# Patient Record
Sex: FEMALE | Race: WHITE | NOT HISPANIC OR LATINO | Employment: FULL TIME | URBAN - METROPOLITAN AREA
[De-identification: names, ages, dates, MRNs, and addresses within clinical notes are randomized per-mention and may not be internally consistent; named-entity substitution may affect disease eponyms.]

---

## 2017-02-13 ENCOUNTER — TRANSCRIBE ORDERS (OUTPATIENT)
Dept: URGENT CARE | Facility: CLINIC | Age: 57
End: 2017-02-13

## 2017-02-13 DIAGNOSIS — S46.819A TRAPEZIUS STRAIN, UNSPECIFIED LATERALITY, INITIAL ENCOUNTER: Primary | ICD-10-CM

## 2017-02-16 ENCOUNTER — TREATMENT (OUTPATIENT)
Dept: PHYSICAL THERAPY | Facility: CLINIC | Age: 57
End: 2017-02-16

## 2017-02-16 DIAGNOSIS — S46.911D SHOULDER STRAIN, RIGHT, SUBSEQUENT ENCOUNTER: ICD-10-CM

## 2017-02-16 DIAGNOSIS — S16.1XXD CERVICAL STRAIN, SUBSEQUENT ENCOUNTER: Primary | ICD-10-CM

## 2017-02-16 PROCEDURE — 97140 MANUAL THERAPY 1/> REGIONS: CPT | Performed by: PHYSICAL THERAPIST

## 2017-02-16 PROCEDURE — 97001 PR PHYS THERAPY EVALUATION: CPT | Performed by: PHYSICAL THERAPIST

## 2017-02-16 PROCEDURE — 97014 ELECTRIC STIMULATION THERAPY: CPT | Performed by: PHYSICAL THERAPIST

## 2017-02-16 PROCEDURE — 97110 THERAPEUTIC EXERCISES: CPT | Performed by: PHYSICAL THERAPIST

## 2017-02-16 NOTE — PROGRESS NOTES
Physical Therapy Initial Evaluation and Plan of Care    TIME IN 11:06 TIME OUT 12:21    Subjective Evaluation    History of Present Illness  Date of onset: 2017  Mechanism of injury: Catching a patient from falling while transferring her    Pain  Current pain ratin  At best pain ratin  At worst pain ratin  Location: R>>L upper trap region; also N/T into RSF  Quality: pressure and cramping  Relieving factors: heat and change in position  Aggravating factors: lifting and overhead activity  Progression: no change    Hand dominance: right    Diagnostic Tests  X-ray: abnormal (arthritis)    Treatments  Current treatment: medication  Patient Goals  Patient goals for therapy: decreased pain and return to work  Patient goal: STGs x 1 wk  1. Reports dec pain with use of RUE for ADLs to < 4/10  2. Reports dec N/T RUE by > 50%  3. Improved posturing to allow improved ROM  4. Decreased sleep disturbance    LTGs x 4 wks  1. ROM and strength WFL with min to no pain  2. No parasthesias RUE  3. Negative impingement testing  4. Demos dennis for patient transfer simulation to allow RTW w/o restriction             Objective     Postural Observations  Seated posture: fair        Palpation   Left   Hypertonic in the levator scapulae and upper trapezius.   Tenderness of the levator scapulae and upper trapezius.     Right   Hypertonic in the levator scapulae and upper trapezius. Tenderness of the cervical interspinals, levator scapulae and upper trapezius.     Tenderness     Right Shoulder  Tenderness in the AC joint, acromion and biceps tendon (proximal).     Active Range of Motion   Cervical/Thoracic Spine   Cervical    Flexion: 43 degrees   Extension: 32 degrees with pain  Left lateral flexion: 25 degrees   Right lateral flexion: 20 degrees   Left rotation: 41 degrees   Right rotation: 48 degrees   Left Shoulder   Flexion: WFL  Abduction: WFL  External rotation BTH: WFL  Internal rotation BTB: WFL    Right Shoulder    Flexion: 128 degrees   Abduction: 121 degrees   External rotation BTH: T2   Internal rotation BTB: T10     Strength/Myotome Testing   Cervical Spine   Neck extension: 4  Neck flexion: 4    Left   Neck lateral flexion (C3): 4+    Right   Neck lateral flexion (C3): 4    Left Shoulder   Normal muscle strength    Right Shoulder     Planes of Motion   Flexion: 4-   Adduction: 4+   External rotation at 0°: 5   Internal rotation at 0°: 5     Tests   Cervical     Right   Negative cervical distraction and Spurling's sign.     Right Shoulder   Positive active compression ([place]), Hawkin's, Speed's, ULTT1 and ULTT3.   Negative sulcus sign.          Assessment & Plan     Assessment  Assessment details: 57 yo F with acute onset pain and RUE parasthesias from transferring a patient presents with signs/sxs shdr impingement with biceps and labral involvement and ESTEFANIA.  Prognosis: good    Goals  STGs x 1 wk  1. Reports dec pain with use of RUE for ADLs to < 4/10  2. Reports dec N/T RUE by > 50%  3. Improved posturing to allow improved ROM  4. Decreased sleep disturbance    LTGs x 4 wks  1. ROM and strength WFL with min to no pain  2. No parasthesias RUE  3. Negative impingement testing  4. Demos dennis for patient transfer simulation to allow RTW w/o restriction      Plan  Therapy options: will be seen for skilled physical therapy services  Planned modality interventions: cryotherapy, thermotherapy (hydrocollator packs), electrical stimulation/[place] stimulation and ultrasound  Planned therapy interventions: manual therapy, soft tissue mobilization, spinal/joint mobilization, strengthening, stretching, home exercise program and therapeutic activities  Frequency: 3x week  Duration in weeks: 4  Treatment plan discussed with: patient        Manual Therapy:    10     mins  20319;  Therapeutic Exercise:    13     mins  80498;     Neuromuscular Donna:    0    mins  15936;    Therapeutic Activity:     3    mins  95150;     Gait Training:       0     mins  32944;     Ultrasound:     0     mins  28146;    Work Hardening           0      mins 69027  Iontophoresis               0   mins 56823    Timed Treatment:   26   mins   Total Treatment:     65   mins    PT SIGNATURE: Elsy Kolb, YULIA   DATE TREATMENT INITIATED: 2/16/2017    Initial Certification  Certification Period: 5/17/2017  I certify that the therapy services are furnished while this patient is under my care.  The services outlined above are required by this patient, and will be reviewed every 90 days.     PHYSICIAN:       DATE:     Please sign and return via fax to [phone].. Thank you, [facility].

## 2017-02-16 NOTE — PATIENT INSTRUCTIONS
Educated about Dx and exam findings, rationale and POC. Gave handout on HEP with instructions.  Posture

## 2017-02-20 ENCOUNTER — TREATMENT (OUTPATIENT)
Dept: PHYSICAL THERAPY | Facility: CLINIC | Age: 57
End: 2017-02-20

## 2017-02-20 DIAGNOSIS — S16.1XXD CERVICAL STRAIN, SUBSEQUENT ENCOUNTER: Primary | ICD-10-CM

## 2017-02-20 DIAGNOSIS — S46.911D SHOULDER STRAIN, RIGHT, SUBSEQUENT ENCOUNTER: ICD-10-CM

## 2017-02-20 PROCEDURE — 97110 THERAPEUTIC EXERCISES: CPT | Performed by: PHYSICAL THERAPIST

## 2017-02-20 PROCEDURE — 97014 ELECTRIC STIMULATION THERAPY: CPT | Performed by: PHYSICAL THERAPIST

## 2017-02-20 PROCEDURE — 97140 MANUAL THERAPY 1/> REGIONS: CPT | Performed by: PHYSICAL THERAPIST

## 2017-02-20 NOTE — PROGRESS NOTES
Physical Therapy Daily Progress Note    Time In 9:19  Time Out 10;12    Yelena Flower reports: still hurts and tight and difficulty sleeping but feels better; not as N/T    Subjective Evaluation    Pain  Current pain ratin           Objective   See Exercise, Manual, and Modality Logs for complete treatment.       Assessment & Plan     Assessment  Assessment details: Responding favorably to Rx with dec c/o pain and parasthesias but still with mod sxs and notable tightness cerv-thor mm    Plan  Plan details: Progress postural ex as dennis                     Manual Therapy:    10     mins  61333;  Therapeutic Exercise:    24     mins  43102;     Neuromuscular Donna:    0    mins  60644;    Therapeutic Activity:     0     mins  57865;     Gait Trainin     mins  56802;     Ultrasound:     0     mins  38826;    Work Hardening           0      mins 76789  Iontophoresis               0   mins 50042    Timed Treatment:   34   mins   Total Treatment:     53   mins    Elsy Kolb PT  Physical Therapist

## 2017-02-22 ENCOUNTER — TREATMENT (OUTPATIENT)
Dept: PHYSICAL THERAPY | Facility: CLINIC | Age: 57
End: 2017-02-22

## 2017-02-22 DIAGNOSIS — S16.1XXD CERVICAL STRAIN, SUBSEQUENT ENCOUNTER: Primary | ICD-10-CM

## 2017-02-22 DIAGNOSIS — S46.911D SHOULDER STRAIN, RIGHT, SUBSEQUENT ENCOUNTER: ICD-10-CM

## 2017-02-22 PROCEDURE — 97014 ELECTRIC STIMULATION THERAPY: CPT | Performed by: PHYSICAL THERAPIST

## 2017-02-22 PROCEDURE — 97140 MANUAL THERAPY 1/> REGIONS: CPT | Performed by: PHYSICAL THERAPIST

## 2017-02-22 PROCEDURE — 97110 THERAPEUTIC EXERCISES: CPT | Performed by: PHYSICAL THERAPIST

## 2017-02-22 NOTE — PROGRESS NOTES
Physical Therapy Daily Progress Note    Time In 8:33  Time Out 9:37    Yelena Flower reports: a little better - tight still in the mornings and evenings    Subjective Evaluation    Pain  Current pain ratin           Objective     Tenderness     Additional Tenderness Details  R parascap     See Exercise, Manual, and Modality Logs for complete treatment.       Assessment & Plan     Assessment  Assessment details: Mild and gradual improvement overall.  Continues with moderate soft-tissue tension and persistent paresthesias but improving    Plan  Plan details: Progress as dennis                     Manual Therapy:    13     mins  06096;  Therapeutic Exercise:    30     mins  62504;     Neuromuscular Donna:    0    mins  12735;    Therapeutic Activity:     0     mins  74422;     Gait Trainin     mins  42117;     Ultrasound:     0     mins  43523;    Work Hardening           0      mins 26885  Iontophoresis               0   mins 77994    Timed Treatment:   43   mins   Total Treatment:     63   mins    Elsy Kolb PT  Physical Therapist

## 2017-02-23 ENCOUNTER — TREATMENT (OUTPATIENT)
Dept: PHYSICAL THERAPY | Facility: CLINIC | Age: 57
End: 2017-02-23

## 2017-02-23 DIAGNOSIS — S16.1XXD CERVICAL STRAIN, SUBSEQUENT ENCOUNTER: Primary | ICD-10-CM

## 2017-02-23 DIAGNOSIS — S46.911D SHOULDER STRAIN, RIGHT, SUBSEQUENT ENCOUNTER: ICD-10-CM

## 2017-02-23 PROCEDURE — 97140 MANUAL THERAPY 1/> REGIONS: CPT | Performed by: PHYSICAL THERAPIST

## 2017-02-23 PROCEDURE — 97110 THERAPEUTIC EXERCISES: CPT | Performed by: PHYSICAL THERAPIST

## 2017-02-23 PROCEDURE — 97014 ELECTRIC STIMULATION THERAPY: CPT | Performed by: PHYSICAL THERAPIST

## 2017-02-23 NOTE — PROGRESS NOTES
Physical Therapy Daily Progress Note    Time In 1:02  Time Out 2:08    Yelena Flower reports: pain in lower back yesterday; neck and shoulder tight - not moving as well; pinky feels numb and cold    Subjective Evaluation    Pain  Current pain ratin  Location: neck and shoulder           Objective     Tests     Right Shoulder   Positive ULTT3.      See Exercise, Manual, and Modality Logs for complete treatment.       Assessment & Plan     Assessment  Assessment details: Continues with only mild improvement.  Tested positive today for ulnar n. Tension.  Only brief GHJ mobs needed for ~ full ROM without impingement    Plan  Plan details: Assess for MD                     Manual Therapy:    14     mins  50693;  Therapeutic Exercise:    30     mins  50472;     Neuromuscular Donna:    0    mins  05467;    Therapeutic Activity:     0     mins  80935;     Gait Trainin     mins  08572;     Ultrasound:     0     mins  80454;    Work Hardening           0      mins 52104  Iontophoresis               0   mins 04215    Timed Treatment:   44   mins   Total Treatment:     66   mins    Elsy Kolb PT  Physical Therapist

## 2017-02-27 ENCOUNTER — TREATMENT (OUTPATIENT)
Dept: PHYSICAL THERAPY | Facility: CLINIC | Age: 57
End: 2017-02-27

## 2017-02-27 DIAGNOSIS — S16.1XXD CERVICAL STRAIN, SUBSEQUENT ENCOUNTER: Primary | ICD-10-CM

## 2017-02-27 DIAGNOSIS — S46.911D SHOULDER STRAIN, RIGHT, SUBSEQUENT ENCOUNTER: ICD-10-CM

## 2017-02-27 PROCEDURE — 97110 THERAPEUTIC EXERCISES: CPT | Performed by: PHYSICAL THERAPIST

## 2017-02-27 PROCEDURE — 97014 ELECTRIC STIMULATION THERAPY: CPT | Performed by: PHYSICAL THERAPIST

## 2017-02-27 PROCEDURE — 97140 MANUAL THERAPY 1/> REGIONS: CPT | Performed by: PHYSICAL THERAPIST

## 2017-02-27 NOTE — PROGRESS NOTES
Physical Therapy  Progress Note    Time In 9:32  Time Out 10:53      2017  MELVA Rosa    Re: Yelena Crenshaw  ________________________________________________________________    Ms. Yelena Crenshaw, has attended 5 PT sessions.  Treatment has consisted of: manual, ex, modalities, HEP, pt ed     S: Ms. Yelena Crenshaw states: only a little better.  Now having some tingling on left side at base of neck and still numbness in R pinky finger.  More freer movement sometimes during the day but the pain is always there.          Subjective Evaluation    Pain  Current pain ratin  At best pain ratin           Objective     Postural Observations  Seated posture: fair    Additional Postural Observation Details  Guarded movement     Palpation   Left   Hypertonic in the upper trapezius.   Tenderness of the levator scapulae and upper trapezius.     Right   Hypertonic in the levator scapulae and upper trapezius. Tenderness of the cervical interspinals, levator scapulae and upper trapezius.     Tenderness     Right Shoulder  Tenderness in the AC joint, acromion and biceps tendon (proximal).     Active Range of Motion   Cervical/Thoracic Spine   Cervical    Flexion: 15 degrees with pain  Extension: 36 degrees   Left lateral flexion: 23 degrees   Right lateral flexion: 25 degrees   Left rotation: 50 degrees   Right rotation: 45 degrees   Left Shoulder   Flexion: WFL  Abduction: WFL  External rotation BTH: WFL  Internal rotation BTB: WFL    Right Shoulder   Flexion: 131 degrees   Abduction: 130 degrees   External rotation BTH: T2   Internal rotation BTB: T9     Strength/Myotome Testing   Cervical Spine   Neck extension: 4+  Neck flexion: 4+    Left   Neck lateral flexion (C3): 4+    Right   Neck lateral flexion (C3): 4+    Left Shoulder   Normal muscle strength    Right Shoulder     Planes of Motion   Flexion: 4+   Abduction: 4+   External rotation at 0°: 5   Internal rotation at 0°: 5     Tests    Cervical     Left   Positive Spurling's sign.     Right   Negative cervical distraction and Spurling's sign.     Right Shoulder   Positive Hawkin's, Speed's, ULTT1 and ULTT3.   Negative active compression ([place]), empty can and sulcus sign.     Additional Tests Details  Proximal tingling with L Spurlings    Functional Assessment     Comments  NT today due to continued mod-severe pain and guarding     See Exercise, Manual, and Modality Logs for complete treatment.       Assessment & Plan     Assessment  Assessment details: Only minimal overall improvement.  Continues with signs/sxs of cervical strain with ? mild n. Root compression L, ESTEFANIA R and R shdr impingement with biceps and possible labral involvement.    Plan  Plan details: Please advise after your exam                     Manual Therapy:    17     mins  72289;  Therapeutic Exercise:    30     mins  38516;     Neuromuscular Donna:    0    mins  41835;    Therapeutic Activity:     5     mins  28283;     Gait Trainin     mins  38245;     Ultrasound:     0     mins  25188;    Work Hardening           0      mins 96402  Iontophoresis               0   mins 10449    Timed Treatment:   52   mins   Total Treatment:     81   mins    Elsy Kolb PT  Physical Therapist

## 2017-03-01 ENCOUNTER — TREATMENT (OUTPATIENT)
Dept: PHYSICAL THERAPY | Facility: CLINIC | Age: 57
End: 2017-03-01

## 2017-03-01 DIAGNOSIS — S16.1XXD CERVICAL STRAIN, SUBSEQUENT ENCOUNTER: Primary | ICD-10-CM

## 2017-03-01 PROCEDURE — 97140 MANUAL THERAPY 1/> REGIONS: CPT | Performed by: PHYSICAL THERAPIST

## 2017-03-01 PROCEDURE — 97014 ELECTRIC STIMULATION THERAPY: CPT | Performed by: PHYSICAL THERAPIST

## 2017-03-01 PROCEDURE — 97110 THERAPEUTIC EXERCISES: CPT | Performed by: PHYSICAL THERAPIST

## 2017-03-01 NOTE — PROGRESS NOTES
Physical Therapy Daily Progress Note    Time In 1105  Time Out 1215    Yelena Crenshaw reports: She states that she is having more pain but the movement seems a little better.     Subjective     Objective   See Exercise, Manual, and Modality Logs for complete treatment.       Assessment & Plan     Assessment  Assessment details: Patient tolerated treatment fair.  She continues to have significant hypomobility at upper thoracic segments with associated muscle guarding in upper trap and rhomboid with cervical AROM limitations. MRI is pending.     Plan  Plan details: Continue as tolerated.                      Manual Therapy:    25     mins  55168;  Therapeutic Exercise:    20     mins  01125;           Timed Treatment:   45   mins   Total Treatment:     60   mins    Kaya Castillo, PT  Physical Therapist

## 2017-03-03 ENCOUNTER — TREATMENT (OUTPATIENT)
Dept: PHYSICAL THERAPY | Facility: CLINIC | Age: 57
End: 2017-03-03

## 2017-03-03 DIAGNOSIS — S16.1XXD CERVICAL STRAIN, SUBSEQUENT ENCOUNTER: Primary | ICD-10-CM

## 2017-03-03 DIAGNOSIS — S46.911D SHOULDER STRAIN, RIGHT, SUBSEQUENT ENCOUNTER: ICD-10-CM

## 2017-03-03 PROCEDURE — 97014 ELECTRIC STIMULATION THERAPY: CPT | Performed by: PHYSICAL THERAPIST

## 2017-03-03 PROCEDURE — 97110 THERAPEUTIC EXERCISES: CPT | Performed by: PHYSICAL THERAPIST

## 2017-03-03 PROCEDURE — 97140 MANUAL THERAPY 1/> REGIONS: CPT | Performed by: PHYSICAL THERAPIST

## 2017-03-03 NOTE — PROGRESS NOTES
Physical Therapy Daily Progress Note    Time In 9:32  Time Out 10:46    Yelena Flower reports:N/T more pronounced this morning; still hurts in UT area - also hurting in jaw    Subjective Evaluation    Pain  Current pain ratin           Objective     Palpation     Right   Hypertonic in the scalenes, sternocleidomastoid and upper trapezius. Tenderness of the scalenes, sternocleidomastoid and upper trapezius.     Tests     Right Shoulder   Positive ULTT1 and ULTT3.     Additional Tests Details  Increased RUE N/T with cervical distraction     See Exercise, Manual, and Modality Logs for complete treatment.       Assessment & Plan     Assessment  Assessment details: Continues with minimal overall improvement.  Awaiting scheduling of MRI for C-spine.    Plan  Plan details: Conservative Rx until MRI                     Manual Therapy:    14     mins  80967;  Therapeutic Exercise:    30     mins  86544;     Neuromuscular Donna:    0    mins  03915;    Therapeutic Activity:     0     mins  77416;     Gait Trainin     mins  09380;     Ultrasound:     0     mins  00284;    Work Hardening           0      mins 92475  Iontophoresis               0   mins 33336    Timed Treatment:   44   mins   Total Treatment:     74   mins    Elsy Kolb, PT  Physical Therapist

## 2017-03-06 ENCOUNTER — TREATMENT (OUTPATIENT)
Dept: PHYSICAL THERAPY | Facility: CLINIC | Age: 57
End: 2017-03-06

## 2017-03-06 PROCEDURE — 97140 MANUAL THERAPY 1/> REGIONS: CPT | Performed by: PHYSICAL THERAPIST

## 2017-03-06 PROCEDURE — 97014 ELECTRIC STIMULATION THERAPY: CPT | Performed by: PHYSICAL THERAPIST

## 2017-03-06 PROCEDURE — 97110 THERAPEUTIC EXERCISES: CPT | Performed by: PHYSICAL THERAPIST

## 2017-03-06 NOTE — PROGRESS NOTES
Physical Therapy Daily Progress Note    Time In 9:38  Time Out 10:40    Yelena Flower reports: about the same except jaw hurting more yesterday    Subjective Evaluation    Pain  Current pain ratin  Location: R jaw, neck, shdr, scapula           Objective     Postural Observations    Additional Postural Observation Details  Guarded posture and movement     Active Range of Motion     Additional Active Range of Motion Details  Mod limited RROT and RSB; others limited but WFL    Tests   Cervical     Right   Positive Spurling's sign.     Additional Tests Details  Inc tingling also with distraction     See Exercise, Manual, and Modality Logs for complete treatment.       Assessment & Plan     Assessment  Assessment details: Continues with minimal overall improvement with PT.  Signs/sxs possible cervical radiculopathy.  Awaiting scheduling of MRI    Plan  Plan details: Pt instructed to continue with HEP as dennis until MRI and MD f/u                     Manual Therapy:    10     mins  55876;  Therapeutic Exercise:    28     mins  28803;     Neuromuscular Donna:    0    mins  73573;    Therapeutic Activity:     0     mins  71143;     Gait Trainin     mins  67398;     Ultrasound:     0     mins  40348;    Work Hardening           0      mins 50943  Iontophoresis               0   mins 42992    Timed Treatment:   38   mins   Total Treatment:     62   mins    Elsy Kolb, PT  Physical Therapist

## 2017-04-27 ENCOUNTER — DOCUMENTATION (OUTPATIENT)
Dept: PHYSICAL THERAPY | Facility: CLINIC | Age: 57
End: 2017-04-27

## 2017-04-27 NOTE — PROGRESS NOTES
Discharge Summary  Discharge Summary from Physical Therapy Report      Dates  PT visit: 2/16-3/6/17  Number of Visits: 8     Discharge Status of Patient: See last Note dated 3/6/17    Goals: Partially Met    Discharge Plan: Continue with current home exercise program as instructed  Patient to return to referring/providing physician    Comments MRI scheduled    Date of Discharge 3/20/17        Elsy Kolb, PT  Physical Therapist

## 2018-01-15 ENCOUNTER — TRANSCRIBE ORDERS (OUTPATIENT)
Dept: PHYSICAL THERAPY | Facility: CLINIC | Age: 58
End: 2018-01-15

## 2018-01-15 DIAGNOSIS — S39.012A STRAIN OF LUMBAR REGION, INITIAL ENCOUNTER: ICD-10-CM

## 2018-01-15 DIAGNOSIS — S46.912A STRAIN OF LEFT SHOULDER, INITIAL ENCOUNTER: Primary | ICD-10-CM

## 2018-01-16 ENCOUNTER — TREATMENT (OUTPATIENT)
Dept: PHYSICAL THERAPY | Facility: CLINIC | Age: 58
End: 2018-01-16

## 2018-01-16 DIAGNOSIS — S16.1XXD STRAIN OF NECK MUSCLE, SUBSEQUENT ENCOUNTER: Primary | ICD-10-CM

## 2018-01-16 DIAGNOSIS — S39.012D STRAIN OF LUMBAR REGION, SUBSEQUENT ENCOUNTER: ICD-10-CM

## 2018-01-16 PROCEDURE — 97530 THERAPEUTIC ACTIVITIES: CPT | Performed by: PHYSICAL THERAPIST

## 2018-01-16 PROCEDURE — 97110 THERAPEUTIC EXERCISES: CPT | Performed by: PHYSICAL THERAPIST

## 2018-01-16 PROCEDURE — 97001 PR PHYS THERAPY EVALUATION: CPT | Performed by: PHYSICAL THERAPIST

## 2018-01-16 PROCEDURE — 97140 MANUAL THERAPY 1/> REGIONS: CPT | Performed by: PHYSICAL THERAPIST

## 2018-01-16 NOTE — PROGRESS NOTES
"  Orthopedic / Sports / Industrial Physical Therapy  Physical Therapy Initial Evaluation and Plan of Care    Patient Name: Yelena Crenshaw          :  1960  Referring Physician: MELVA Salazar  Diagnosis: Strain of neck muscle, subsequent encounter [S16.1XXD]; Lumbar strain / SI Jt Dysfunction;   Date of Evaluation: 2018  ______________________________________________________________________    Subjective Evaluation    History of Present Illness  Onset date: 2018.  Mechanism of injury: Psych Pt from [place] broke thru three \"unbreakable\" doors  AWOL=[facility] hospital  (lg male) ran into Pt violently running into her hitting her on her (L) side pushing her twisting her -     Treatment with flexeril, mobic (taking before), and rest - no better -     Subjective comment: Previous injury was treatment at [facility] PT - no good - went to [facility] - had dry needling and has success after plateau -   Patient Occupation: RN - Psych at the [employer] -    Precautions and Work Restrictions: See MD note - Pain  Current pain ratin  At worst pain ratin  Location: (L) LB > R;  (L) shoulder pain posterior; Neck pain w/ spasms L>>R and (L) UT -   Quality: Pulsing in neck, throbbing:  Ache in LB.  Alleviating factors: Warm tub with epson salts; hot shower; Heat;  support in lumbar when sitting;   Exacerbated by: NECK: Turning neck; reaching, lifting:   LB: Sitting; Standing, bending, rising, pulling, lifting;   Progression: no change    Hand dominance: right    Diagnostic Tests  No diagnostic tests performed    Treatments  Current treatment: medication  Patient Goals  Patient/family treatment goals: Pain alleviation; Normal mobility, gait, function, and  RTW w/o restrictions -        ___________________________________________________  Objective       Postural Observations  Standing posture: Fwd head / Rounded shoulder posture - (L) UT/Post C-triangle hypertrophy - (L) [place] / ASIS / PSIS higher vs (R) -  " Hyperlordosis -         Palpation     Additional Palpation Details  Very tender UT, scalenes, first rib (L>>R);   Tender (L) SI Jt  / LSS region - L4-S1 centrally     Active Range of Motion     Additional Active Range of Motion Details  Limited and painful lumbar flexion and SB to (L) -   Limited C-Spine: Extension 40-deg; SB (L) 15-deg; Rotation: (R) 70-deg; (L) 45-deg w/ pain (L) neck / UT -   UE AROM: Limited FE (L) 95-deg with pain in (L) UT / neck; Limited (L) IRB with pain in (L) UT / neck    See Treatment Flow sheet for Exercises, Manual therapy, and modalities.   FUNCTIONAL ACTIVITIES: X 10 min  · TAPING / BRACING: NA  · Jt protection, ADL modification; Posture and     ___________________________________________________  Assessment & Plan     Assessment  Assessment details:   Cervical / Trap strain L>>R; Elevated 1st rib (L) > (R)  Lumbar strain ; SI Jt Dysfunction;     PROBLEMS: Pain; Limited mobility, postural dysfunction, limited function, and unable to perform normal job -   PROGNOSIS: Good     GOALS:   SHORT TERM GOALS: 2 weeks:  1) HEP Initiated; 2) Pain decreased 50%:   3) AROM  increased:  4) Improved functional ability grossly;     LONG TERM GOALS: 4 weeks (or at time of DISCHARGE): 1) (I) HEP; 2) AROM WFL and pain free; 3) Strength / mobility to be able to perform all ADL's and job-related activities w/o restrictions;       Plan  Planned therapy interventions: abdominal trunk stabilization, body mechanics training, flexibility, home exercise program, joint mobilization, manual therapy, neuromuscular re-education, postural training, soft tissue mobilization, spinal/joint mobilization, strengthening, stretching and therapeutic activities (Modalities prn; Taping / bracing prn; )  Frequency: 3x week  Duration in weeks: 4  Treatment plan discussed with: patient        Pt noted decreased pain and demonstrated improved mobility of cervical and lumbar spine after manual therapy techniques -    ___________________________________________________  Manual Therapy:    25     mins  91522;   Therapeutic Exercise:    15     mins  57898;     Neuromuscular Donna:        mins  05307;   Therapeutic Activity:     10     mins  09780;     Ultrasound:     10     mins  55056;    Electrical Stimulation:   20     mins  60562 ( );  Dry Needling          mins self-pay   Gait Training:          mins  65658;  EVAL TIME:   25 mins    Timed Treatment:   60   mins                Total Treatment:     115   mins    PT SIGNATURE:   Ramon Molina, PT  DATE TREATMENT INITIATED: 1/16/2018  ___________________________________________________  Initial Certification  Certification Period: 4/16/2018  I certify that the therapy services are furnished while this patient is under my care.  The services outlined above are required by this patient, and will be reviewed every 90 days.     PHYSICIAN: ________________________________  DATE: ______  MELVA Salazar        Please sign and return via fax to [phone].. Thank you, [facility] Physical Therapy.  ______________________________________________________________________  [address] [place] [address]  Phone: [phone] Fax: [phone]

## 2018-01-17 ENCOUNTER — TREATMENT (OUTPATIENT)
Dept: PHYSICAL THERAPY | Facility: CLINIC | Age: 58
End: 2018-01-17

## 2018-01-17 DIAGNOSIS — S16.1XXD STRAIN OF NECK MUSCLE, SUBSEQUENT ENCOUNTER: Primary | ICD-10-CM

## 2018-01-17 DIAGNOSIS — S39.012D STRAIN OF LUMBAR REGION, SUBSEQUENT ENCOUNTER: ICD-10-CM

## 2018-01-17 DIAGNOSIS — S16.1XXD CERVICAL STRAIN, SUBSEQUENT ENCOUNTER: ICD-10-CM

## 2018-01-17 PROCEDURE — 97530 THERAPEUTIC ACTIVITIES: CPT | Performed by: PHYSICAL THERAPIST

## 2018-01-17 PROCEDURE — 97140 MANUAL THERAPY 1/> REGIONS: CPT | Performed by: PHYSICAL THERAPIST

## 2018-01-17 PROCEDURE — 97110 THERAPEUTIC EXERCISES: CPT | Performed by: PHYSICAL THERAPIST

## 2018-01-18 ENCOUNTER — TREATMENT (OUTPATIENT)
Dept: PHYSICAL THERAPY | Facility: CLINIC | Age: 58
End: 2018-01-18

## 2018-01-18 DIAGNOSIS — S39.012D STRAIN OF LUMBAR REGION, SUBSEQUENT ENCOUNTER: ICD-10-CM

## 2018-01-18 DIAGNOSIS — S16.1XXD CERVICAL STRAIN, SUBSEQUENT ENCOUNTER: ICD-10-CM

## 2018-01-18 DIAGNOSIS — S16.1XXD STRAIN OF NECK MUSCLE, SUBSEQUENT ENCOUNTER: Primary | ICD-10-CM

## 2018-01-18 PROCEDURE — 97140 MANUAL THERAPY 1/> REGIONS: CPT | Performed by: PHYSICAL THERAPIST

## 2018-01-18 PROCEDURE — 97530 THERAPEUTIC ACTIVITIES: CPT | Performed by: PHYSICAL THERAPIST

## 2018-01-18 PROCEDURE — 97110 THERAPEUTIC EXERCISES: CPT | Performed by: PHYSICAL THERAPIST

## 2018-01-18 NOTE — PROGRESS NOTES
Physical Therapy Daily Progress Note    Patient Name: Yelena Crenshaw         :  1960  Referring Physician: MELVA Salazar    Subjective   Yelena Crenshaw reports:  improvement with decreased pain and improved mobility and function. Decreased pain in UT and (L) LB - UT tender;  Intermittent pain across LB / LSS with standing, sitting, bending, extension -     Objective   Level pelvis - Hyperlordosis; Decreased hypertrophy of (L) UT / post-cervical triangle - Tender (L) UT and tight, but improved overall - less tender over 1st rib and scalenes -   Improved AROM lumbar spine and C-spine - Pain with lumbar extension and initiating ext from flexed position -   (+) Flexed sacrum  -     See Exercise, Manual, and Modality Logs for complete treatment.     Functional / Therapeutic Activities:  20 min  · TAPING / BRACING: K-Tape to unload UT's (B)  · SEE EXERCISE FLOW SHEET -   · Jt protection, ADL modification; Posture and      Assessment/Plan  Cervical / Trap strain L>>R; Elevated 1st rib (L) > (R)  Lumbar strain ; SI Jt Dysfunction;   Improving with decreased pain and improved mobility and function -     Progress strengthening /stabilization /functional activity       _________________________________________________  Manual Therapy:    25     mins  68866;  Therapeutic Exercise:    30     mins  23307;     Neuromuscular Donna:        mins  25115;    Therapeutic Activity:     15     mins  15078;     Gait Training:           mins  68326;     Ultrasound:     10     mins  61973;    Electrical Stimulation:         mins  51812 ( );  Dry Needling          mins self-pay    Timed Treatment:   80   mins                  Total Treatment:     90   mins    Ramon Molina, PT  Physical Therapist

## 2018-01-19 ENCOUNTER — TRANSCRIBE ORDERS (OUTPATIENT)
Dept: URGENT CARE | Facility: CLINIC | Age: 58
End: 2018-01-19

## 2018-01-19 DIAGNOSIS — S46.912A STRAIN OF LEFT SHOULDER, INITIAL ENCOUNTER: Primary | ICD-10-CM

## 2018-01-19 DIAGNOSIS — S39.012A STRAIN OF LUMBAR REGION, INITIAL ENCOUNTER: ICD-10-CM

## 2018-01-22 ENCOUNTER — TREATMENT (OUTPATIENT)
Dept: PHYSICAL THERAPY | Facility: CLINIC | Age: 58
End: 2018-01-22

## 2018-01-22 DIAGNOSIS — S16.1XXD STRAIN OF NECK MUSCLE, SUBSEQUENT ENCOUNTER: Primary | ICD-10-CM

## 2018-01-22 DIAGNOSIS — S16.1XXD CERVICAL STRAIN, SUBSEQUENT ENCOUNTER: ICD-10-CM

## 2018-01-22 PROCEDURE — 97110 THERAPEUTIC EXERCISES: CPT | Performed by: PHYSICAL THERAPIST

## 2018-01-22 PROCEDURE — 97140 MANUAL THERAPY 1/> REGIONS: CPT | Performed by: PHYSICAL THERAPIST

## 2018-01-22 PROCEDURE — 97014 ELECTRIC STIMULATION THERAPY: CPT | Performed by: PHYSICAL THERAPIST

## 2018-01-22 NOTE — PROGRESS NOTES
Physical Therapy Daily Progress Note     Patient Name: Yelena Crenshaw         :  1960  Referring Physician: MELVA Salazar     Subjective   Yelena Crenshaw reports:  improvement with decreased pain and improved mobility and function. Decreased pain in UT and (L) LB - UT tender;  She woke up this morning with increased  LB / LSS and (L) constant -      Objective   (L) Ilium / ASIS / PSIS higher - Hyperlordosis; Decreased hypertrophy of (L) UT / post-cervical triangle - Tender (L) UT and tight, but improved overall - less tender over 1st rib and scalenes -   Limited and painful AROM lumbar spine - Pain with lumbar flexion,  extension and initiating ext from flexed position -   (+) Flexed sacrum  -    (+) Upshear (L) Ilium / SI  See Exercise, Manual, and Modality Logs for complete treatment.      Functional / Therapeutic Activities:  20 min  · TAPING / BRACING: K-Tape to unload UT's (B)  · SEE EXERCISE FLOW SHEET -   · Jt protection, ADL modification; Posture and       Assessment/Plan  Cervical / Trap strain L>>R; Elevated 1st rib (L) > (R)  Lumbar strain ; SI Jt Dysfunction;   Decreased pain and improved mobility and function after MT -      Progress strengthening /stabilization /functional activity     _________________________________________________  Manual Therapy:                      30     mins  07963;  Therapeutic Exercise:              30     mins  19370;     Neuromuscular Donna:                   mins  83281;    Therapeutic Activity:                15     mins  52886;     Gait Training:                                          mins  58019;     Ultrasound:                                         10     mins  21553;    Electrical Stimulation:                   mins  17179 ( );  Dry Needling                                           mins self-pay     Timed Treatment:   85   mins                  Total Treatment:     90   mins     Ramon Molina PT  Physical Therapist

## 2018-01-23 NOTE — PROGRESS NOTES
Physical Therapy Daily Progress Note      Patient Name: Yelena Crenshaw         :  1960  Referring Physician: MELVA Salazar      Subjective   Yelena Crenshaw reports:  increased pain in (R) UT, neck and (L) UT pain with limited mobility into SB and rotation with increased pain - Notes decreased LBP, but reports pain in mid back -   Pt also discussed very stressful situation with her mother as she is declining and requires much help physically and the patient was with her a lot over the weekend - Very stessed -      Objective   Guarded posture upper quarter - Limited and painful cervical flexion, rotation and SB to (L) > (R)   Very tender with multiple trigger points (B) UT; Very tender over 1st rib (B) -   (+) Elevated 1st rib (R / L) -     See Exercise, Manual, and Modality Logs for complete treatment.      Functional / Therapeutic Activities:   min  · TAPING / BRACING: NA  · SEE EXERCISE FLOW SHEET -   · Jt protection, ADL modification; Posture and        Assessment/Plan  Cervical / Trap strain L>>R; Elevated 1st rib (L) > (R)  Lumbar strain ; SI Jt Dysfunction;   Decreased pain and improved mobility and function after MT -       Progress strengthening /stabilization /functional activity      _________________________________________________  Manual Therapy:                      30     mins  72164;  Therapeutic Exercise:              20     mins  59716;     Neuromuscular Donna:                   mins  25256;    Therapeutic Activity:                     mins  28231;     Gait Training:                                          mins  80008;     Ultrasound:                                         15     mins  21689;    Electrical Stimulation:             20      mins  98358 ( );  Dry Needling                                           mins self-pay      Timed Treatment:   65   mins                  Total Treatment:     90   mins      Ramon Molina PT  Physical Therapist

## 2018-01-24 ENCOUNTER — TREATMENT (OUTPATIENT)
Dept: PHYSICAL THERAPY | Facility: CLINIC | Age: 58
End: 2018-01-24

## 2018-01-24 DIAGNOSIS — M53.3 SACROILIAC JOINT DYSFUNCTION: ICD-10-CM

## 2018-01-24 DIAGNOSIS — S16.1XXD CERVICAL STRAIN, SUBSEQUENT ENCOUNTER: ICD-10-CM

## 2018-01-24 DIAGNOSIS — S39.012D STRAIN OF LUMBAR REGION, SUBSEQUENT ENCOUNTER: ICD-10-CM

## 2018-01-24 DIAGNOSIS — S16.1XXD STRAIN OF NECK MUSCLE, SUBSEQUENT ENCOUNTER: Primary | ICD-10-CM

## 2018-01-24 PROCEDURE — 97140 MANUAL THERAPY 1/> REGIONS: CPT | Performed by: PHYSICAL THERAPIST

## 2018-01-24 PROCEDURE — 97530 THERAPEUTIC ACTIVITIES: CPT | Performed by: PHYSICAL THERAPIST

## 2018-01-24 PROCEDURE — 97110 THERAPEUTIC EXERCISES: CPT | Performed by: PHYSICAL THERAPIST

## 2018-01-25 ENCOUNTER — TREATMENT (OUTPATIENT)
Dept: PHYSICAL THERAPY | Facility: CLINIC | Age: 58
End: 2018-01-25

## 2018-01-25 DIAGNOSIS — S39.012D STRAIN OF LUMBAR REGION, SUBSEQUENT ENCOUNTER: Primary | ICD-10-CM

## 2018-01-25 DIAGNOSIS — M53.3 SACROILIAC JOINT DYSFUNCTION: ICD-10-CM

## 2018-01-25 DIAGNOSIS — S16.1XXD CERVICAL STRAIN, SUBSEQUENT ENCOUNTER: ICD-10-CM

## 2018-01-25 DIAGNOSIS — S16.1XXD STRAIN OF NECK MUSCLE, SUBSEQUENT ENCOUNTER: ICD-10-CM

## 2018-01-25 PROCEDURE — 97110 THERAPEUTIC EXERCISES: CPT | Performed by: PHYSICAL THERAPIST

## 2018-01-25 PROCEDURE — 97140 MANUAL THERAPY 1/> REGIONS: CPT | Performed by: PHYSICAL THERAPIST

## 2018-01-25 PROCEDURE — 97530 THERAPEUTIC ACTIVITIES: CPT | Performed by: PHYSICAL THERAPIST

## 2018-01-29 ENCOUNTER — TREATMENT (OUTPATIENT)
Dept: PHYSICAL THERAPY | Facility: CLINIC | Age: 58
End: 2018-01-29

## 2018-01-29 DIAGNOSIS — M53.3 SACROILIAC JOINT DYSFUNCTION: ICD-10-CM

## 2018-01-29 DIAGNOSIS — S39.012D STRAIN OF LUMBAR REGION, SUBSEQUENT ENCOUNTER: ICD-10-CM

## 2018-01-29 DIAGNOSIS — S16.1XXD CERVICAL STRAIN, SUBSEQUENT ENCOUNTER: Primary | ICD-10-CM

## 2018-01-29 DIAGNOSIS — S16.1XXD STRAIN OF NECK MUSCLE, SUBSEQUENT ENCOUNTER: ICD-10-CM

## 2018-01-29 PROCEDURE — 97530 THERAPEUTIC ACTIVITIES: CPT | Performed by: PHYSICAL THERAPIST

## 2018-01-29 PROCEDURE — 97110 THERAPEUTIC EXERCISES: CPT | Performed by: PHYSICAL THERAPIST

## 2018-01-29 PROCEDURE — 97140 MANUAL THERAPY 1/> REGIONS: CPT | Performed by: PHYSICAL THERAPIST

## 2018-01-29 NOTE — PROGRESS NOTES
Physical Therapy Daily Progress Note      Patient Name: Yelena Crenshaw         :  1960  Referring Physician: MELVA Salazar      Subjective   Yelena Crenshaw reports:  decreased pain in (R) UT, neck and (L) UT with improved mobility  - Decreased LBP - Intermittent (L)  and central LSS pain -       Objective   Less guarded posture upper quarter - Limited and painful cervical flexion, rotation and SB to (L) > (R)   Very tender with multiple trigger points (B) UT; Very tender over 1st rib (B) -   (+) Elevated 1st rib (R / L) -   Improved AROM Lumbar spine with pain (L) LSS/ SI region  (L) Ilium / ASIS / PSIS higher vs (R)  (+) SI Jt Dysfunction / Upshear;       See Exercise, Manual, and Modality Logs for complete treatment.      Functional / Therapeutic Activities:  10 min  · TAPING / BRACING: NA  · SEE EXERCISE FLOW SHEET -   · Jt protection, ADL modification; Posture and        Assessment/Plan  Cervical / Trap strain L>>R; Elevated 1st rib (L) and (R)  Lumbar strain ; SI Jt Dysfunction;   Decreased pain and improved mobility and function after MT -       Progress strengthening /stabilization /functional activity      _________________________________________________  Manual Therapy:                      35    mins  43444;  Therapeutic Exercise:              30     mins  95302;     Neuromuscular Donna:                   mins  83547;    Therapeutic Activity:                 10     mins  61754;     Gait Training:                                          mins  55435;     Ultrasound:                                              mins  14530;    Electrical Stimulation:                   mins  58402 ( );  Dry Needling                                           mins self-pay      Timed Treatment:   75   mins                  Total Treatment:     90   mins      Ramon Molina PT  Physical Therapist

## 2018-01-29 NOTE — PROGRESS NOTES
Physical Therapy Daily Progress Note      Patient Name: Yelena Crenshaw         :  1960  Referring Physician: MELVA Salazar      Subjective   Yelena Crenshaw reports:  decreased pain in (R) UT, neck and (L) UT with improved mobility  - Decreased LBP - Intermittent (L)  and central LSS pain -       Objective   Less guarded posture upper quarter - Limited and painful cervical flexion, rotation and SB to (L) > (R)   Very tender with multiple trigger points (B) UT; Very tender over 1st rib (B) -   (+) Elevated 1st rib (R / L) -   (L) Ilium / ASIS / PSIS higher vs (R)  (+) SI Jt Dysfunction / Upshear;      See Exercise, Manual, and Modality Logs for complete treatment.      Functional / Therapeutic Activities:  10 min  · TAPING / BRACING: NA  · SEE EXERCISE FLOW SHEET -   · Jt protection, ADL modification; Posture and        Assessment/Plan  Cervical / Trap strain L>>R; Elevated 1st rib (L) and (R)  Lumbar strain ; SI Jt Dysfunction;   Decreased pain and improved mobility and function after MT -       Progress strengthening /stabilization /functional activity      _________________________________________________  Manual Therapy:                      35    mins  75997;  Therapeutic Exercise:              30     mins  96815;     Neuromuscular Donna:                   mins  91111;    Therapeutic Activity:                 10     mins  90540;     Gait Training:                                          mins  19819;     Ultrasound:                                              mins  95746;    Electrical Stimulation:                   mins  43381 ( );  Dry Needling                                           mins self-pay      Timed Treatment:   75   mins                  Total Treatment:     90   mins      Ramon Molina PT  Physical Therapist

## 2018-01-31 ENCOUNTER — TREATMENT (OUTPATIENT)
Dept: PHYSICAL THERAPY | Facility: CLINIC | Age: 58
End: 2018-01-31

## 2018-01-31 DIAGNOSIS — S16.1XXD STRAIN OF NECK MUSCLE, SUBSEQUENT ENCOUNTER: ICD-10-CM

## 2018-01-31 DIAGNOSIS — S16.1XXD CERVICAL STRAIN, SUBSEQUENT ENCOUNTER: Primary | ICD-10-CM

## 2018-01-31 PROCEDURE — 97110 THERAPEUTIC EXERCISES: CPT | Performed by: PHYSICAL THERAPIST

## 2018-01-31 PROCEDURE — 97140 MANUAL THERAPY 1/> REGIONS: CPT | Performed by: PHYSICAL THERAPIST

## 2018-01-31 PROCEDURE — 97530 THERAPEUTIC ACTIVITIES: CPT | Performed by: PHYSICAL THERAPIST

## 2018-02-01 ENCOUNTER — TREATMENT (OUTPATIENT)
Dept: PHYSICAL THERAPY | Facility: CLINIC | Age: 58
End: 2018-02-01

## 2018-02-01 DIAGNOSIS — M53.3 SACROILIAC JOINT DYSFUNCTION: ICD-10-CM

## 2018-02-01 DIAGNOSIS — S16.1XXD CERVICAL STRAIN, SUBSEQUENT ENCOUNTER: Primary | ICD-10-CM

## 2018-02-01 DIAGNOSIS — S16.1XXD STRAIN OF NECK MUSCLE, SUBSEQUENT ENCOUNTER: ICD-10-CM

## 2018-02-01 DIAGNOSIS — S39.012D STRAIN OF LUMBAR REGION, SUBSEQUENT ENCOUNTER: ICD-10-CM

## 2018-02-01 PROCEDURE — 97110 THERAPEUTIC EXERCISES: CPT | Performed by: PHYSICAL THERAPIST

## 2018-02-01 PROCEDURE — 97140 MANUAL THERAPY 1/> REGIONS: CPT | Performed by: PHYSICAL THERAPIST

## 2018-02-01 PROCEDURE — 97530 THERAPEUTIC ACTIVITIES: CPT | Performed by: PHYSICAL THERAPIST

## 2018-02-01 NOTE — PROGRESS NOTES
Physical Therapy Daily Progress Note      Patient Name: Yelena Crenshaw         :  1960  Referring Physician: MELVA Salazar      Subjective   Yelena Crenshaw reports:  decreased pain with improved mobility and function - Decreased LBP - Intermittent (L)  and central LSS pain - Notes pain in (L) neck / UT into medial scap region -       Objective   Less guarded posture upper quarter - Limited and painful cervical flexion, rotation and SB to (L) > (R) but still improved overall -   Very tender with multiple trigger points (R>>L) UT; Very tender over 1st rib (L>>R)) -   (+) Elevated 1st rib (R ) -   Improved AROM Lumbar spine with pain (L) LSS/ SI region  (L) Ilium / ASIS / PSIS higher vs (R)  (+) SI Jt Dysfunction / Upshear;       See Exercise, Manual, and Modality Logs for complete treatment.      Functional / Therapeutic Activities:  10 min  · TAPING / BRACING: NA  · SEE EXERCISE FLOW SHEET -   · Jt protection, ADL modification; Posture and        Assessment/Plan  Cervical / Trap strain L>>R; Elevated 1st rib (L) and (R)  Lumbar strain ; SI Jt Dysfunction;   Decreased pain and improved mobility and function after MT -       Progress strengthening /stabilization /functional activity      _________________________________________________  Manual Therapy:                      25    mins  43668;  Therapeutic Exercise:              30     mins  21036;     Neuromuscular Donna:                   mins  86597;    Therapeutic Activity:                 10     mins  52561;     Gait Training:                                          mins  85109;     Ultrasound:                                        10      mins  96446;    Electrical Stimulation:                   mins  15051 ( );  Dry Needling                                           mins self-pay      Timed Treatment:   75   mins                  Total Treatment:     90   mins      Ramon Molina PT  Physical Therapist

## 2018-02-04 NOTE — PROGRESS NOTES
Physical Therapy Daily Progress Note      Patient Name: Yelena Crenshaw         :  1960  Referring Physician: MELVA Salazar      Subjective   Yelena Crenshaw reports:  Feeling much better after last session with decreased pain with improved mobility and function - Decreased LBP - Intermittent (L)  and central LSS pain, but much less intense and often - Notes pain in (R) neck / UT into medial scap region -  Pt purchased a TENS unit -       Objective   Less guarded posture upper quarter - Improved, but painful cervical flexion at end range, painful rotation and SB to (L) > (R) but still improved overall -   Tender with multiple trigger points (R>>L) UT; Very tender over 1st rib (L>>R)) -   (+) Elevated 1st rib (R ) -       See Exercise, Manual, and Modality Logs for complete treatment.      Functional / Therapeutic Activities:  10 min  · TAPING / BRACING: NA   · Instructed Pt in use and fitted Pt with TENS unit  · Jt protection, ADL modification; Posture and        Assessment/Plan  Cervical / Trap strain L>>R; Elevated 1st rib (L) and (R)  Lumbar strain ; SI Jt Dysfunction;   Decreased pain and improved mobility and function after MT -       Progress strengthening /stabilization /functional activity      _________________________________________________  Manual Therapy:                      30    mins  55087;  Therapeutic Exercise:              15     mins  27426;     Neuromuscular Donna:                   mins  17036;    Therapeutic Activity:                10      mins  41920;     Gait Training:                                 mins  82942;     Ultrasound:                              10      mins  83471;    Electrical Stimulation:                   mins  23460 ( );  Dry Needling                             15      mins NC -       Timed Treatment:   80   mins                  Total Treatment:     90   mins      Ramon Molina PT  Physical Therapist

## 2018-02-04 NOTE — PROGRESS NOTES
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 and then had to help her mother including doing her hair - Decreased LBP - Intermittent (L)  and central LSS pain, but much less intense and often - -  Pt using her TENS unit -       Objective   Guarded posture upper quarter - Painful cervical flexion at end range, painful rotation and SB to (L) > (R) but still improved overall -   Tender with multiple trigger points (R>L) UT; Very tender over 1st rib (Land R)) -   (+) Elevated 1st rib (R and L ) -   (L) [place]lium / ASIS/PSIS higher vs (R)  Painful and limited lumbar flexion and SB to (L)  (+) SI Jt Dysfunction / Upshear (L)       See Exercise, Manual, and Modality Logs for complete treatment.      Functional / Therapeutic Activities:  10 min  · TAPING / BRACING: NA   · SEE EXERCISE FLOW SHEET -   · Jt protection, ADL modification; Posture and        Assessment/Plan  Cervical / Trap strain L>>R; Elevated 1st rib (L) and (R)  Lumbar strain ; SI Jt Dysfunction;   Decreased pain and improved mobility and function after MT -       Progress strengthening /stabilization /functional activity      _________________________________________________  Manual Therapy:                      25    mins  17272;  Therapeutic Exercise:              30     mins  06374;     Neuromuscular Donna:                   mins  50850;    Therapeutic Activity:                10      mins  62986;     Gait Training:                                 mins  71254;     Ultrasound:                              10      mins  08019;    Electrical Stimulation:                   mins  72194 ( );  Dry Needling                                   mins NC -       Timed Treatment:   75   mins                   Total Treatment:     90   mins      Ramon Molina, PT  Physical Therapist

## 2018-02-07 ENCOUNTER — TREATMENT (OUTPATIENT)
Dept: PHYSICAL THERAPY | Facility: CLINIC | Age: 58
End: 2018-02-07

## 2018-02-07 DIAGNOSIS — S16.1XXD STRAIN OF NECK MUSCLE, SUBSEQUENT ENCOUNTER: ICD-10-CM

## 2018-02-07 DIAGNOSIS — S39.012D STRAIN OF LUMBAR REGION, SUBSEQUENT ENCOUNTER: ICD-10-CM

## 2018-02-07 DIAGNOSIS — M53.3 SACROILIAC JOINT DYSFUNCTION: ICD-10-CM

## 2018-02-07 DIAGNOSIS — S16.1XXD CERVICAL STRAIN, SUBSEQUENT ENCOUNTER: Primary | ICD-10-CM

## 2018-02-07 PROCEDURE — 97110 THERAPEUTIC EXERCISES: CPT | Performed by: PHYSICAL THERAPIST

## 2018-02-07 PROCEDURE — 97140 MANUAL THERAPY 1/> REGIONS: CPT | Performed by: PHYSICAL THERAPIST

## 2018-02-07 PROCEDURE — 97530 THERAPEUTIC ACTIVITIES: CPT | Performed by: PHYSICAL THERAPIST

## 2018-02-07 NOTE — PROGRESS NOTES
Physical Therapy Daily Progress Note      Patient Name: Yelena Crenshaw         :  1960  Referring Physician: MELVA Salazar      Subjective   Yelena Crenshaw reports:  increased pain in L>R UT and neck and (L) LB after sitting in the bleachers at a [employer] game and then had to help her mother including doing her hair - Decreased LBP - Intermittent (L)  and central LSS pain, but much less intense and often - -  Pt using her TENS unit -       Objective   Guarded posture upper quarter - Painful cervical flexion at end range, painful rotation and SB to (L) > (R) but still improved overall -   Tender with multiple trigger points (R>L) UT; Very tender over 1st rib (Land R)) -   (+) Elevated 1st rib (R and L ) -   (L) [place]lium / ASIS/PSIS higher vs (R)  Painful and limited lumbar flexion and SB to (L)  (+) SI Jt Dysfunction / Upshear (L)       See Exercise, Manual, and Modality Logs for complete treatment.      Functional / Therapeutic Activities:  10 min  · TAPING / BRACING: NA   · SEE EXERCISE FLOW SHEET -   · Jt protection, ADL modification; Posture and        Assessment/Plan  Cervical / Trap strain L>>R; Elevated 1st rib (L) and (R)  Lumbar strain ; SI Jt Dysfunction;   Decreased pain and improved mobility and function after MT -       Progress strengthening /stabilization /functional activity      _________________________________________________  Manual Therapy:                      25    mins  19055;  Therapeutic Exercise:              30     mins  93132;     Neuromuscular Donna:                   mins  95203;    Therapeutic Activity:                10      mins  50766;     Gait Training:                                 mins  98902;     Ultrasound:                              10      mins  06860;    Electrical Stimulation:                   mins  86020 ( );  Dry Needling                                   mins NC -       Timed Treatment:   75   mins                   Total Treatment:     90   mins      Ramon Molina, PT  Physical Therapist

## 2018-02-09 ENCOUNTER — TREATMENT (OUTPATIENT)
Dept: PHYSICAL THERAPY | Facility: CLINIC | Age: 58
End: 2018-02-09

## 2018-02-09 DIAGNOSIS — S16.1XXD CERVICAL STRAIN, SUBSEQUENT ENCOUNTER: Primary | ICD-10-CM

## 2018-02-09 DIAGNOSIS — S39.012D STRAIN OF LUMBAR REGION, SUBSEQUENT ENCOUNTER: ICD-10-CM

## 2018-02-09 DIAGNOSIS — S16.1XXD STRAIN OF NECK MUSCLE, SUBSEQUENT ENCOUNTER: ICD-10-CM

## 2018-02-09 DIAGNOSIS — M53.3 SACROILIAC JOINT DYSFUNCTION: ICD-10-CM

## 2018-02-09 PROCEDURE — 97530 THERAPEUTIC ACTIVITIES: CPT | Performed by: PHYSICAL THERAPIST

## 2018-02-09 PROCEDURE — 97110 THERAPEUTIC EXERCISES: CPT | Performed by: PHYSICAL THERAPIST

## 2018-02-11 NOTE — PROGRESS NOTES
Physical Therapy Daily Progress Note      Patient Name: Yelena Crenshaw         :  1960  Referring Physician: MELVA Salazar      Subjective   Yelena Crenshaw reports:   continued improvement with decreased pain and improved mobility and function. Less pain in (R) UT / neck / scap, but soreness still present - Decreased LBP last couple of days -        Objective   Improved posturing -  Tender with multiple trigger points (R>L) UT;/ levator -region  (-) Elevated 1st rib (R and L ) -   Level pelvis  Lumbar AROM WFL and painfree -   (-) SI Jt Dysfunction / Upshear (L)       See Exercise, Manual, and Modality Logs for complete treatment.      Functional / Therapeutic Activities:  15 min  · TAPING / BRACING: NA   · SEE EXERCISE FLOW SHEET -   · Jt protection, ADL modification; Posture and        Assessment/Plan  Cervical / Trap strain L>>R; Elevated 1st rib (L) and (R)  Lumbar strain ; SI Jt Dysfunction;   Decreased pain and improved mobility and function after MT -       Progress strengthening /stabilization /functional activity      _________________________________________________  Manual Therapy:                      15    mins  32462;  Therapeutic Exercise:              40     mins  83847;     Neuromuscular Donna:                   mins  17015;    Therapeutic Activity:                15      mins  85964;     Gait Training:                                 mins  97610;     Ultrasound:                              10      mins  58218;    Electrical Stimulation:                   mins  06400 ( );  Dry Needling                                   mins NC - WC      Timed Treatment:   80  mins                  Total Treatment:     90   mins      Ramon Molina PT  Physical Therapist

## 2018-02-12 ENCOUNTER — TREATMENT (OUTPATIENT)
Dept: PHYSICAL THERAPY | Facility: CLINIC | Age: 58
End: 2018-02-12

## 2018-02-12 DIAGNOSIS — M53.3 SACROILIAC JOINT DYSFUNCTION: ICD-10-CM

## 2018-02-12 DIAGNOSIS — S16.1XXD STRAIN OF NECK MUSCLE, SUBSEQUENT ENCOUNTER: ICD-10-CM

## 2018-02-12 DIAGNOSIS — S39.012D STRAIN OF LUMBAR REGION, SUBSEQUENT ENCOUNTER: ICD-10-CM

## 2018-02-12 DIAGNOSIS — S16.1XXD CERVICAL STRAIN, SUBSEQUENT ENCOUNTER: Primary | ICD-10-CM

## 2018-02-12 PROCEDURE — 97110 THERAPEUTIC EXERCISES: CPT | Performed by: PHYSICAL THERAPIST

## 2018-02-12 PROCEDURE — 97530 THERAPEUTIC ACTIVITIES: CPT | Performed by: PHYSICAL THERAPIST

## 2018-02-12 PROCEDURE — 97140 MANUAL THERAPY 1/> REGIONS: CPT | Performed by: PHYSICAL THERAPIST

## 2018-02-13 NOTE — PROGRESS NOTES
Physical Therapy Daily Progress Note      Patient Name: Yelena Crenshaw         :  1960  Referring Physician: MELVA Salazar      Subjective   Yelena Crenshaw reports:   continued improvement with decreased pain and improved mobility and function. Less pain in (R) UT / neck / scap, but soreness still present -Notes pain in (R) neck / UT wiith (L) Cervical Rotation and SB -  Minimal LBP since last session, but noted inccreased LBP today at work as she was up/down allot all day - Pain central > (R) LSS region       Objective   Improved posturing -  Tender with multiple trigger points (R>L) UT;/ levator -region  (+) Elevated 1st rib (R and L ) -   Level pelvis  Lumbar AROM WFL but pain central LSS / sacral region with flexion -   (-) SI Jt Dysfunction / Upshear (L); (+) Flexed sacrum       See Exercise, Manual, and Modality Logs for complete treatment.      Functional / Therapeutic Activities:  15 min  · TAPING / BRACING: NA   · SEE EXERCISE FLOW SHEET -   · Jt protection, ADL modification; Posture and        Assessment/Plan  Cervical / Trap strain L>>R; Elevated 1st rib (L) and (R)  Lumbar strain ; SI Jt Dysfunction;   Improving with decreased pain and improved mobility and function -   Some persistent (R) > (L) neck / UT > LBP;   Minimal pain and improved mobility and function after MT -       Progress strengthening /stabilization /functional activity      _________________________________________________  Manual Therapy:                      25    mins  31041;  Therapeutic Exercise:              25     mins  45925;     Neuromuscular Donna:                   mins  57584;    Therapeutic Activity:                15      mins  84616;     Gait Training:                                 mins  60038;     Ultrasound:                              10      mins  70793;    Electrical Stimulation:                   mins  80380 ( );  Dry Needling                                   mins NC  - WC      Timed Treatment:   60  mins                  Total Treatment:     75   mins      Ramon Molina, PT  Physical Therapist

## 2018-02-21 ENCOUNTER — TREATMENT (OUTPATIENT)
Dept: PHYSICAL THERAPY | Facility: CLINIC | Age: 58
End: 2018-02-21

## 2018-02-21 DIAGNOSIS — S16.1XXD CERVICAL STRAIN, SUBSEQUENT ENCOUNTER: Primary | ICD-10-CM

## 2018-02-21 DIAGNOSIS — S39.012D STRAIN OF LUMBAR REGION, SUBSEQUENT ENCOUNTER: ICD-10-CM

## 2018-02-21 DIAGNOSIS — S16.1XXD STRAIN OF NECK MUSCLE, SUBSEQUENT ENCOUNTER: ICD-10-CM

## 2018-02-21 DIAGNOSIS — M53.3 SACROILIAC JOINT DYSFUNCTION: ICD-10-CM

## 2018-02-21 PROCEDURE — 97530 THERAPEUTIC ACTIVITIES: CPT | Performed by: PHYSICAL THERAPIST

## 2018-02-21 PROCEDURE — 97110 THERAPEUTIC EXERCISES: CPT | Performed by: PHYSICAL THERAPIST

## 2018-02-21 PROCEDURE — 97140 MANUAL THERAPY 1/> REGIONS: CPT | Performed by: PHYSICAL THERAPIST

## 2018-02-26 ENCOUNTER — TREATMENT (OUTPATIENT)
Dept: PHYSICAL THERAPY | Facility: CLINIC | Age: 58
End: 2018-02-26

## 2018-02-26 DIAGNOSIS — S16.1XXD CERVICAL STRAIN, SUBSEQUENT ENCOUNTER: Primary | ICD-10-CM

## 2018-02-26 DIAGNOSIS — S39.012D STRAIN OF LUMBAR REGION, SUBSEQUENT ENCOUNTER: ICD-10-CM

## 2018-02-26 PROCEDURE — 97035 APP MDLTY 1+ULTRASOUND EA 15: CPT | Performed by: PHYSICAL THERAPIST

## 2018-02-26 PROCEDURE — 97110 THERAPEUTIC EXERCISES: CPT | Performed by: PHYSICAL THERAPIST

## 2018-02-26 PROCEDURE — 97140 MANUAL THERAPY 1/> REGIONS: CPT | Performed by: PHYSICAL THERAPIST

## 2018-02-26 NOTE — PROGRESS NOTES
Physical Therapy Daily Progress Note            Subjective Evaluation    History of Present Illness    Subjective comment: patient reports that both her neck and low back are bothering her today.        Objective   See Exercise, Manual, and Modality Logs for complete treatment.       Assessment & Plan     Assessment  Assessment details: Patient tolerated treatment fairly well.  She continues to have tightness in bilateral upper trap and levator with forward head posture.  Independent with HEP at this time.      Plan  Plan details: Progress as tolerated.                      Manual Therapy:    12     mins  34774;  Therapeutic Exercise:    40     mins  85493;     Ultrasound 8 minutes      Timed Treatment:   60   mins   Total Treatment:     60   mins    Kaya Castillo, PT  Physical Therapist

## 2018-02-27 ENCOUNTER — TREATMENT (OUTPATIENT)
Dept: PHYSICAL THERAPY | Facility: CLINIC | Age: 58
End: 2018-02-27

## 2018-02-27 DIAGNOSIS — S39.012D STRAIN OF LUMBAR REGION, SUBSEQUENT ENCOUNTER: ICD-10-CM

## 2018-02-27 DIAGNOSIS — S16.1XXD CERVICAL STRAIN, SUBSEQUENT ENCOUNTER: Primary | ICD-10-CM

## 2018-02-27 PROCEDURE — 97110 THERAPEUTIC EXERCISES: CPT | Performed by: PHYSICAL THERAPIST

## 2018-02-27 PROCEDURE — 97140 MANUAL THERAPY 1/> REGIONS: CPT | Performed by: PHYSICAL THERAPIST

## 2018-02-27 NOTE — PROGRESS NOTES
Re-Assessment / Re-Certification      Patient: Yelena Crenshaw   : 1960  Diagnosis/ICD-10 Code:  Cervical strain, subsequent encounter [S16.1XXD]  Referring practitioner: aRdha Lugo MD   Date of Initial Visit: Type: THERAPY  Noted: 2018  Today's Date: 2018  Patient seen for 9 sessions      Subjective:     Clinical Progress: improved  Home Program Compliance: Yes  Treatment has included: therapeutic exercise, manual therapy and ultrasound    Subjective Evaluation    History of Present Illness  Mechanism of injury: I'm hurting today with + sleep disturbance.  Pt noted intermittent numbness into L pinky finger.  My vision feels fuzzy.      Pain  Current pain ratin         Objective       Palpation   Left   Hypertonic in the scalenes.   Tenderness of the levator scapulae and upper trapezius.     Right   Hypertonic in the scalenes. Tenderness of the levator scapulae and upper trapezius.     Tenderness   Cervical Spine   Tenderness in the facet joint, spinous process, left 1st rib and right 1st rib.     Additional Tenderness Details  Elevated L 1st rib    Active Range of Motion   Cervical/Thoracic Spine   Cervical    Flexion: 35 degrees with pain  Extension: WFL  Left lateral flexion: 15 degrees with pain  Right lateral flexion: 30 degrees with pain  Left rotation: 41 degrees with pain  Right rotation: 65 degrees with pain    Lumbar   Flexion: WFL  Extension: 20 degrees with pain  Left lateral flexion: 23 degrees   Right lateral flexion: 20 degrees with pain    Strength/Myotome Testing     Additional Strength Details  B UE myotomes 5/5    Tests   Cervical     Left   Negative Spurling's sign.     Right   Negative Spurling's sign.     Left Shoulder   Negative ULTT3.     Right Shoulder   Positive ULTT3.     Additional Tests Details  - vert a testing.      + L quadrant     Assessment & Plan     Assessment  Impairments: abnormal or restricted ROM and pain with function  Assessment details:  Pt exhibits decreased C-S ROM with associated pain which is responding favorably to skilled PT.  Lack of sleep tolerance is impacting progress.  Pt would benefit from a continued course of skilled PT to decrease pain and restore function.        Progress toward previous goals: Partially Met    Goals - progressing  2 weeks:  1) HEP Initiated; 2) Pain decreased 50%:   3) AROM  increased:  4) Improved functional ability grossly;     LONG TERM GOALS: 4 weeks (or at time of DISCHARGE): 1) (I) HEP; 2) AROM WFL and pain free; 3) Strength / mobility to be able to perform all ADL's and job-related activities w/o restrictions;       Recommendations: Continue as planned  Timeframe: 1 month  Prognosis to achieve goals: good    PT Signature: Tereza Olmedo, PT  [place] License # [id number]    Based upon review of the patient's progress and continued therapy plan, it is my medical opinion that Y[facility] Crenshaw [facility]  [address] [facility] [address] , [facility] [address]  [facility] [address] [phone].    Signature: __________________________________  Radha Lugo MD    Manual Therapy:    15     mins  44318;  Therapeutic Exercise:    40     mins  84427;     Neuromuscular Donna:    -    mins  67002;    Therapeutic Activity:     -     mins  34717;     Gait Training:      -     mins  73349;     Ultrasound:     10     mins  66084;    Electrical Stimulation:    -     mins  81356 ( );  Dry Needling     -     mins self-pay      Timed Treatment:   65   mins   Total Treatment:     65   mins

## 2018-03-05 ENCOUNTER — TREATMENT (OUTPATIENT)
Dept: PHYSICAL THERAPY | Facility: CLINIC | Age: 58
End: 2018-03-05

## 2018-03-05 DIAGNOSIS — S39.012D STRAIN OF LUMBAR REGION, SUBSEQUENT ENCOUNTER: ICD-10-CM

## 2018-03-05 DIAGNOSIS — S16.1XXD CERVICAL STRAIN, SUBSEQUENT ENCOUNTER: Primary | ICD-10-CM

## 2018-03-05 PROCEDURE — 97110 THERAPEUTIC EXERCISES: CPT | Performed by: PHYSICAL THERAPIST

## 2018-03-05 PROCEDURE — 97140 MANUAL THERAPY 1/> REGIONS: CPT | Performed by: PHYSICAL THERAPIST

## 2018-03-05 NOTE — PATIENT INSTRUCTIONS
Access Code: [email or web address]   URL: [email or web address]   Date: 03/05/2018   Prepared by: Zo Olmedo     Exercises   Supine Diaphragmatic Breathing - 10 reps - 1 sets - 5 hold - 1x daily   Seated Diaphragmatic Breathing - 10 reps - 1 sets - 5 hold - 1x daily   Standing Shoulder Row with Anchored Resistance - 20 reps - 1 sets - 5 hold - 1x daily     Issued green TB for HEP

## 2018-03-05 NOTE — PROGRESS NOTES
Physical Therapy Daily Progress Note    Visit # : 10  Yelena Flower reports: slept better last night after taking low dose Elavil as prescribed by MD.   L-S was hurting when I woke up.       Subjective     Objective   See Exercise, Manual, and Modality Logs for complete treatment.   Elevated L 1st rib; protracted L shoulder    Assessment/Plan  Lengthy discussion about diaphragmatic breathing to decrease input of sympathetic nervous system impacting tension in cervical musculature.  Good tolerance to exercise progression  Progress strengthening /stabilization /functional activity           Manual Therapy:    15     mins  26783;  Therapeutic Exercise:    40     mins  45316;     Neuromuscular Donna:    -    mins  24834;    Therapeutic Activity:     -     mins  23744;     Gait Training:      -     mins  09698;     Ultrasound:     10     mins  77706;    Electrical Stimulation:    -     mins  38785 ( );  Dry Needling     -     mins self-pay      Timed Treatment:   65   mins   Total Treatment:     65   mins        Tereza Olmedo PT  Physical Therapist  [place] License # [id number]

## 2018-03-07 ENCOUNTER — TREATMENT (OUTPATIENT)
Dept: PHYSICAL THERAPY | Facility: CLINIC | Age: 58
End: 2018-03-07

## 2018-03-07 DIAGNOSIS — S39.012D STRAIN OF LUMBAR REGION, SUBSEQUENT ENCOUNTER: ICD-10-CM

## 2018-03-07 DIAGNOSIS — S16.1XXD CERVICAL STRAIN, SUBSEQUENT ENCOUNTER: Primary | ICD-10-CM

## 2018-03-07 PROCEDURE — 97140 MANUAL THERAPY 1/> REGIONS: CPT | Performed by: PHYSICAL THERAPIST

## 2018-03-07 PROCEDURE — 97110 THERAPEUTIC EXERCISES: CPT | Performed by: PHYSICAL THERAPIST

## 2018-03-07 PROCEDURE — 97035 APP MDLTY 1+ULTRASOUND EA 15: CPT | Performed by: PHYSICAL THERAPIST

## 2018-03-07 NOTE — PROGRESS NOTES
Physical Therapy Daily Progress Note    Visit # : 11  Yelena Crenshaw reports: pt reports reduced symptoms today; pain has equalized to both UT regions    Subjective     Objective   See Exercise, Manual, and Modality Logs for complete treatment.   Level 1st rib positions    Assessment/Plan  Decreased muscular tension noted with manual interventions.  Pt is responding favorably to skilled PT.  Pt educated on self suboccipital release with yoga tune up balls/tennis balls in sock.    Progress per Plan of Care           Manual Therapy:    15     mins  57202;  Therapeutic Exercise:    30     mins  25343;     Neuromuscular Donna:    -    mins  87935;    Therapeutic Activity:     -     mins  40143;     Gait Training:      -     mins  85180;     Ultrasound:     10     mins  31293;    Electrical Stimulation:    -     mins  00361 ( );  Dry Needling     -     mins self-pay      Timed Treatment:   55   mins   Total Treatment:     60   mins        Tereza Olmedo, PT  Physical Therapist  [place] License # [id number]

## 2018-03-07 NOTE — PATIENT INSTRUCTIONS
Access Code: [email or web address]   URL: [email or web address]   Date: 03/07/2018   Prepared by: Zo Olmedo     Program Notes   [email or web address]    try Geovanna Song's yoga tune up balls     Exercises   Shoulder External Rotation and Scapular Retraction - 15 reps - 1 sets - 5 second Hold - 2x daily   Doorway Pec Stretch at 60 Degrees Abduction - 2 reps - 1 sets - 20 hold - 1x daily

## 2018-03-12 ENCOUNTER — TREATMENT (OUTPATIENT)
Dept: PHYSICAL THERAPY | Facility: CLINIC | Age: 58
End: 2018-03-12

## 2018-03-12 DIAGNOSIS — S16.1XXD CERVICAL STRAIN, SUBSEQUENT ENCOUNTER: Primary | ICD-10-CM

## 2018-03-12 PROCEDURE — 97140 MANUAL THERAPY 1/> REGIONS: CPT | Performed by: PHYSICAL THERAPIST

## 2018-03-12 PROCEDURE — 97110 THERAPEUTIC EXERCISES: CPT | Performed by: PHYSICAL THERAPIST

## 2018-03-12 PROCEDURE — 97035 APP MDLTY 1+ULTRASOUND EA 15: CPT | Performed by: PHYSICAL THERAPIST

## 2018-03-12 NOTE — PROGRESS NOTES
Physical Therapy Daily Progress Note    Visit # : 12  Yelena Moorebotham reports: neck is feeling stiff and sore today.     Subjective     Objective   See Exercise, Manual, and Modality Logs for complete treatment.   Level 1st ribs    Assessment/Plan  Suboccipital tension eased with manual interventions.  Pt demonstrates good postural awareness and exercise form.   Progress per Plan of Care           Manual Therapy:    15     mins  49054;  Therapeutic Exercise:    30     mins  84607;     Neuromuscular Donan:    -    mins  21439;    Therapeutic Activity:     -     mins  48417;     Gait Training:      -     mins  03199;     Ultrasound:     10     mins  92025;    Electrical Stimulation:    -     mins  75081 ( );  Dry Needling     -     mins self-pay      Timed Treatment:   55   mins   Total Treatment:     60   mins        Tereza Olmedo PT  Physical Therapist  [place] License # [id number]

## 2018-03-19 ENCOUNTER — TREATMENT (OUTPATIENT)
Dept: PHYSICAL THERAPY | Facility: CLINIC | Age: 58
End: 2018-03-19

## 2018-03-19 DIAGNOSIS — M25.512 ACUTE PAIN OF LEFT SHOULDER: Primary | ICD-10-CM

## 2018-03-19 DIAGNOSIS — S39.012D STRAIN OF LUMBAR REGION, SUBSEQUENT ENCOUNTER: ICD-10-CM

## 2018-03-19 DIAGNOSIS — S80.02XD CONTUSION OF LEFT KNEE, SUBSEQUENT ENCOUNTER: ICD-10-CM

## 2018-03-19 DIAGNOSIS — S16.1XXD CERVICAL STRAIN, SUBSEQUENT ENCOUNTER: Primary | ICD-10-CM

## 2018-03-19 PROCEDURE — 97035 APP MDLTY 1+ULTRASOUND EA 15: CPT | Performed by: PHYSICAL THERAPIST

## 2018-03-19 PROCEDURE — 97110 THERAPEUTIC EXERCISES: CPT | Performed by: PHYSICAL THERAPIST

## 2018-03-19 PROCEDURE — 97140 MANUAL THERAPY 1/> REGIONS: CPT | Performed by: PHYSICAL THERAPIST

## 2018-03-19 PROCEDURE — 97001 PR PHYS THERAPY EVALUATION: CPT | Performed by: PHYSICAL THERAPIST

## 2018-03-19 NOTE — PROGRESS NOTES
Physical Therapy Daily Progress Note    Visit # : 13  Yelena Flower reports: pt fell on ice 3/12/18 onto L knee and shoulder walking into work which has aggravated C-S  Subjective     Objective       Active Range of Motion   Cervical/Thoracic Spine   Cervical    Left rotation: 53 degrees   Right rotation: 50 degrees     Additional Active Range of Motion Details  supine     See Exercise, Manual, and Modality Logs for complete treatment.       Assessment/Plan  Good tolerance to all manual interventions today.  Exercises were modified due to shoulder injury.    Progress per Plan of Care            Manual Therapy:    15     mins  27120;  Therapeutic Exercise:    30     mins  63771;     Neuromuscular Donna:    -    mins  31382;    Therapeutic Activity:     -     mins  11084;     Gait Training:      -     mins  06015;     Ultrasound:     10     mins  89782;    Electrical Stimulation:    -     mins  55262 ( );  Dry Needling     -     mins self-pay      Timed Treatment:   45   mins   Total Treatment:     55   mins        Tereza Olmedo PT  Physical Therapist  [place] License # [id number]

## 2018-03-19 NOTE — PATIENT INSTRUCTIONS
Access Code: [email or web address]   URL: [email or web address]   Date: 03/19/2018   Prepared by: Zo Olmedo     Exercises   Circular Shoulder Pendulum with Table Support - 25 reps - 2 sets - 3x daily

## 2018-03-19 NOTE — PATIENT INSTRUCTIONS
Access Code: [email or web address]   URL: [email or web address]   Date: 03/19/2018   Prepared by: Zo Olmedo     Exercises   Supine Cervical Rotation AROM on Pillow - 5 reps - 1 sets - 5 hold - 1x daily   Corner Pec Minor Stretch - 2 reps - 1 sets - 20 hold - 1x daily   Seated Scapular Retraction - 10 reps - 1 sets - 5 hold - 1x daily

## 2018-03-19 NOTE — PROGRESS NOTES
Physical Therapy Initial Evaluation and Plan of Care    Patient: Yelena Crenshaw   : 1960  Diagnosis/ICD-10 Code:  Acute pain of left shoulder [M25.512]  Referring practitioner: Radha Lugo MD    Subjective Evaluation    History of Present Illness  Date of onset: 3/12/2018  Mechanism of injury: Pt fell on ice 3/12/18 onto L knee and shoulder walking into work.  Pt has had some X-rays and is being referred to an orthopedic for her shoulder due to persistent pain.  Pt rates pain in L shoulder/neck 6-8/10.  Pt also notes some pain in L big toe but L knee is feeling better.      Went for a walk yesterday which aggravated shoulder.      Currently in treatment for C-S and L-S after work related injury.          Pain  Current pain ratin  At worst pain ratin  Quality: radiating and discomfort  Aggravating factors: outstretched reach and ambulation    Diagnostic Tests  X-ray: normal             Objective       Tenderness     Left Shoulder   Tenderness in the bicipital groove, medial scapula, subacromial bursa and supraspinatus tendon.   Left Knee   No tenderness in the inferior fat pad and patellar tendon.     Additional Tenderness Details  L SA space    Active Range of Motion   Left Shoulder   Flexion: 140 degrees   Abduction: 130 degrees with pain  Internal rotation 45°: 70 degrees with pain  Internal rotation BTB: T8 with pain  Left Knee   Flexion: 124 degrees   Extension: 0 degrees     Additional Active Range of Motion Details  PF crepitus 30-0 degrees      Strength/Myotome Testing     Left Shoulder     Planes of Motion   Flexion: 4+   Abduction: 4+   External rotation at 0°: 5   Internal rotation at 0°: 5     Left Knee   Flexion: 5  Extension: 5    Tests     Left Shoulder   Positive active compression (Centre) and empty can.   Negative Hawkin's and passive horizontal adduction.     Left Knee   Negative anterior Lachman, lateral Chad and medial Chad.     Ambulation   Weight-Bearing  Status   Assistive device used: none    Observational Gait   Gait: within functional limits          Assessment & Plan     Assessment  Impairments: abnormal or restricted ROM, activity intolerance, impaired physical strength and pain with function  Assessment details:  Yelena Crenshaw is a pleasant 57 y.o. female that presents with signs and symptoms consistent with the above diagnosis. Pt would benefit from skilled PT services in order to address listed impairments and increase tolerance to normal daily activities including ADLs, work and recreational activities.       Prognosis: good  Functional Limitations: carrying objects, lifting, sleeping, pushing, uncomfortable because of pain, reaching behind back and reaching overhead  Goals  Plan Goals: STG In 2-6 weeks  1. Pt to exhibit compliance/independence with HEP.  2. Pt to perform closed-chain strengthening activities with < = minimal increased pain   3.  Improved sleep tolerance  4.  Pt to report improved tolerance to reaching activities    LTG In 6-12 weeks  1. L shoulder flex/abd 5/5 and non-painful to allow for push/pull and lifting activities.  2. Pain not > than 4/10 with ADLs  3. Pt no longer exhibiting + impingement signs to allow for tolerance to OH activities.  4. Quick DASH < 15%      Plan  Therapy options: will be seen for skilled physical therapy services  Planned modality interventions: ultrasound, electrical stimulation/Russian stimulation, cryotherapy and iontophoresis  Planned therapy interventions: manual therapy, joint mobilization, neuromuscular re-education, strengthening, stretching and home exercise program  Frequency: 3x week  Duration in weeks: 12  Treatment plan discussed with: patient        Manual Therapy:    -     mins  16389;  Therapeutic Exercise:    5     mins  11182;     Neuromuscular Donna:    -    mins  58678;    Therapeutic Activity:     -     mins  43835;     Gait Training:      -     mins  95042;     Ultrasound:     -      mins  85567;    Electrical Stimulation:    -     mins  33721 ( );  Dry Needling     -     mins self-pay        Timed Treatment:   5   mins   Total Treatment:     40   mins    PT SIGNATURE: YULIA Benjamin License # [id number]  DATE TREATMENT INITIATED: 3/19/2018    Initial Certification  Certification Period: 6/17/2018  I certify that the therapy services are furnished while this patient is under my care.  The services outlined above are required by this patient, and will be reviewed every 90 days.     PHYSICIAN:       DATE:     Please sign and return via fax to [phone].. Thank you, [facility].

## 2018-03-21 ENCOUNTER — TREATMENT (OUTPATIENT)
Dept: PHYSICAL THERAPY | Facility: CLINIC | Age: 58
End: 2018-03-21

## 2018-03-21 DIAGNOSIS — S39.012D STRAIN OF LUMBAR REGION, SUBSEQUENT ENCOUNTER: ICD-10-CM

## 2018-03-21 DIAGNOSIS — S16.1XXD CERVICAL STRAIN, SUBSEQUENT ENCOUNTER: Primary | ICD-10-CM

## 2018-03-21 PROCEDURE — 97110 THERAPEUTIC EXERCISES: CPT | Performed by: PHYSICAL THERAPIST

## 2018-03-21 PROCEDURE — 97014 ELECTRIC STIMULATION THERAPY: CPT | Performed by: PHYSICAL THERAPIST

## 2018-03-21 PROCEDURE — 97140 MANUAL THERAPY 1/> REGIONS: CPT | Performed by: PHYSICAL THERAPIST

## 2018-03-21 NOTE — PATIENT INSTRUCTIONS
Access Code: [email or web address]   URL: [email or web address]   Date: 03/21/2018   Prepared by: Zo Olmedo     Exercises   Supine Piriformis Stretch with Foot on Ground - 2 reps - 1 sets - 20 hold - 2x daily   Supine Figure 4 Piriformis Stretch - 2 reps - 1 sets - 20 hold - 1x daily   Prone Femoral Nerve Mobilization - 10 reps - 1 sets - 3s hold - 2x daily   Supine Sciatic Nerve Mobilization With Leg on Pillow - 10 reps - 1 sets - 5 hold - 2x daily   Seated Long Arc Quad - 10 reps - 1 sets - 5 hold - 3x daily

## 2018-03-21 NOTE — PROGRESS NOTES
Physical Therapy Daily Progress Note    Visit # : 14  Yelena Crenshaw reports: was off work yesterday due to increased neck/back pain.  Had trouble sleeping last and and woke up with increased pain rated 10/10 in L L-S/hip and has had difficulty putting weight on LLE this morning.  Persistent C-S pain persists into B UT region.     Subjective     Objective       Palpation   Left   Tenderness of the lumbar paraspinals and piriformis.     Tenderness     Left Hip   Tenderness in the sacroiliac joint.     Neurological Testing     Sensation     Lumbar   Left   Diminished: light touch    Comments   Left light touch: L lateral lower leg    Tests     Left Hip   Positive DEON.     Additional Tests Details  L hip/back pain with SLR  LLBP/hip pain with seated slump L     Ambulation     Observational Gait   Gait: antalgic   Decreased left stance time.      See Exercise, Manual, and Modality Logs for complete treatment.       Assessment/Plan  Pt presents with antalgic gait, LLE radiculopathy and m guarding.  Good tolerance to exercise progression to include n glides.  Added MH/e stim to treatment to address increased symptoms.   Progress per Plan of Care           Manual Therapy:    25     mins  63462;  Therapeutic Exercise:    20     mins  46540;     Neuromuscular Donna:    -    mins  13552;    Therapeutic Activity:     -     mins  78889;     Gait Training:      -     mins  86215;     Ultrasound:     10     mins  97795;    Electrical Stimulation:    20     mins  37778 ( );  Dry Needling     -     mins self-pay      Timed Treatment:   55   mins   Total Treatment:     80   mins        Tereza Olmedo PT  Physical Therapist  [place] License # [id number]

## 2018-03-22 ENCOUNTER — HOSPITAL ENCOUNTER (EMERGENCY)
Facility: HOSPITAL | Age: 58
Discharge: HOME | End: 2018-03-22
Attending: EMERGENCY MEDICINE | Admitting: EMERGENCY MEDICINE

## 2018-03-22 ENCOUNTER — APPOINTMENT (OUTPATIENT)
Dept: GENERAL RADIOLOGY | Facility: HOSPITAL | Age: 58
End: 2018-03-22

## 2018-03-22 VITALS
HEART RATE: 89 BPM | DIASTOLIC BLOOD PRESSURE: 87 MMHG | HEIGHT: 66 IN | OXYGEN SATURATION: 97 % | BODY MASS INDEX: 29.25 KG/M2 | SYSTOLIC BLOOD PRESSURE: 123 MMHG | TEMPERATURE: 97.6 F | WEIGHT: 182 LBS | RESPIRATION RATE: 16 BRPM

## 2018-03-22 DIAGNOSIS — S39.012A STRAIN OF LUMBAR REGION, INITIAL ENCOUNTER: Primary | ICD-10-CM

## 2018-03-22 DIAGNOSIS — S70.02XA CONTUSION OF LEFT HIP, INITIAL ENCOUNTER: ICD-10-CM

## 2018-03-22 PROCEDURE — 73502 X-RAY EXAM HIP UNI 2-3 VIEWS: CPT

## 2018-03-22 PROCEDURE — 99283 EMERGENCY DEPT VISIT LOW MDM: CPT

## 2018-03-22 PROCEDURE — 96372 THER/PROPH/DIAG INJ SC/IM: CPT

## 2018-03-22 PROCEDURE — 72110 X-RAY EXAM L-2 SPINE 4/>VWS: CPT

## 2018-03-22 PROCEDURE — 25010000002 KETOROLAC TROMETHAMINE PER 15 MG: Performed by: NURSE PRACTITIONER

## 2018-03-22 RX ORDER — KETOROLAC TROMETHAMINE 30 MG/ML
60 INJECTION, SOLUTION INTRAMUSCULAR; INTRAVENOUS ONCE
Status: COMPLETED | OUTPATIENT
Start: 2018-03-22 | End: 2018-03-22

## 2018-03-22 RX ORDER — HYDROCODONE BITARTRATE AND ACETAMINOPHEN 5; 325 MG/1; MG/1
1 TABLET ORAL EVERY 4 HOURS PRN
Qty: 12 TABLET | Refills: 0 | Status: SHIPPED | OUTPATIENT
Start: 2018-03-22

## 2018-03-22 RX ORDER — CYCLOBENZAPRINE HCL 10 MG
10 TABLET ORAL 3 TIMES DAILY PRN
Qty: 15 TABLET | Refills: 0 | Status: SHIPPED | OUTPATIENT
Start: 2018-03-22

## 2018-03-22 RX ORDER — MELOXICAM 15 MG/1
15 TABLET ORAL DAILY
COMMUNITY

## 2018-03-22 RX ADMIN — KETOROLAC TROMETHAMINE 60 MG: 30 INJECTION, SOLUTION INTRAMUSCULAR at 08:41

## 2018-03-22 NOTE — ED PROVIDER NOTES
"MD ATTESTATION NOTE    The SHON and I have discussed this patient's history, physical exam, and treatment plan.  I have reviewed the documentation and personally had a face to face interaction with the patient. I affirm the documentation and agree with the treatment and plan.  The attached note describes my personal findings.    Pt presents to the ED with left hip pain that radiates up her side, down her leg, and around her back. Pt describes that pain as a \"biting\" pain.  The pain began last week when she fell at work.  Pt reports that she was seen at [facility] and feels like her pain has worsened since she was seen.     On exam, left paralumbar tenderness    XR studies show nothing acute. Discussed plan to discharge. Pt understands and agrees with the plan, all questions answered.    Documentation assistance provided by eugenia Gonzalez for Dr. Aceves.  Information recorded by the scribe was done at my direction and has been verified and validated by me.       Zakiya Gonzalez  03/22/18 1032       Ez Aceves MD  03/22/18 1038    "

## 2018-03-22 NOTE — ED TRIAGE NOTES
Pt to ER c/o pain left hip into leg and occasionally radiating into back.  Pt states fell at work last week and seen at [facility].  States pain has increased.

## 2018-03-22 NOTE — ED NOTES
Pt is drinking coffee on the way to the room. I advised NPO until MD states otherwise. Pt voiced understanding.      Everette Chaparro  03/22/18 0718

## 2018-03-22 NOTE — DISCHARGE INSTRUCTIONS
Medications as ordered  Heat or ice to painful areas with gentle stretching  Activity at tolerated  Follow up as per Workman's comp recommendation  Off work until Monday  Return to er for numbness/tingling to legs, loss of bowel/bladder function, increased pain, or any new or worsening symptoms

## 2018-03-22 NOTE — ED PROVIDER NOTES
EMERGENCY DEPARTMENT ENCOUNTER    CHIEF COMPLAINT  Chief Complaint: hip pain  History given by: patient  History limited by: N/A  Room Number: 15/15  PMD: Tariq Youngblood MD      HPI:  Pt is a 57 y.o. female who presents for hip pain. She reports that about 9 days ago, while she was walking into work, she slipped on ice and fell onto her left side. Since then, she has had left hip pain that radiates to her LLE and left lower back. It is exacerbated by movement. She has also had intermittent LLE numbness/tingling. Additionally, she c/o worsening of chronic neck pain and bilateral shoulder pain that started in 1/2018 after work injury. She denies any new injury or trauma, bladder dysfunction, bowel dysfunction, saddle anesthesia, motor loss, pain and difficulty with urination, fevers, chills, chest pain, abd pain, and N/V/D. She reports that she has taken tylenol and is currently undergoing physical therapy but they have only provided temporary sx relief. She also reports that she went to [facility] today and was referred to ED for further evaluation. Past Medical History of chronic neck pain.     Duration: started about 9 days ago   Timing: intermittent  Location: left hip  Radiation: LLE, left lower back  Quality: pain  Intensity/Severity: moderate  Progression: not improved  Associated Symptoms: intermittent LLE numbness/tingling, worsening of chronic neck pain, bilateral shoulder pain  Aggravating Factors: movement   Alleviating Factors: taking tylenol, physical therapy  Previous Episodes: Pt states that she has chronic neck pain.   Treatment before arrival: Pt reports that she has taken tylenol and is currently undergoing physical therapy but they have only provided temporary sx relief.     PAST MEDICAL HISTORY  Active Ambulatory Problems     Diagnosis Date Noted   • No Active Ambulatory Problems     Resolved Ambulatory Problems     Diagnosis Date Noted   • No Resolved Ambulatory Problems     Past Medical  History:   Diagnosis Date   • Allergic    • Cancer    • Headache    • Injury of back    • Injury of neck        PAST SURGICAL HISTORY  Past Surgical History:   Procedure Laterality Date   • HYSTERECTOMY     • TONSILLECTOMY         FAMILY HISTORY  History reviewed. No pertinent family history.    SOCIAL HISTORY  Social History     Social History   • Marital status:      Spouse name: N/A   • Number of children: N/A   • Years of education: N/A     Occupational History   • Not on file.     Social History Main Topics   • Smoking status: Former Smoker   • Smokeless tobacco: Not on file   • Alcohol use Yes   • Drug use: No   • Sexual activity: Not on file     Other Topics Concern   • Not on file     Social History Narrative   • No narrative on file         ALLERGIES  Ventolin [albuterol]    REVIEW OF SYSTEMS  Review of Systems   Constitutional: Negative for chills and fever.   HENT: Negative for sore throat.    Respiratory: Negative for shortness of breath.    Cardiovascular: Negative for chest pain.   Gastrointestinal: Negative for abdominal pain, nausea and vomiting.        No bowel dysfunction   Genitourinary: Negative for difficulty urinating and dysuria.        No bladder dysfunction   Musculoskeletal: Positive for back pain (left hip pain radiating to LLE and left lower back) and neck pain (worsening of chronic neck pain).        Left hip pain radiating to LLE and left lower back, bilateral shoulder pain   Skin: Negative for rash.   Neurological: Positive for numbness (intermittent LLE numbness/tingling). Negative for dizziness and weakness.        No saddle anesthesia    Psychiatric/Behavioral: The patient is not nervous/anxious.        PHYSICAL EXAM  ED Triage Vitals   Temp Heart Rate Resp BP SpO2   03/22/18 0706 03/22/18 0701 03/22/18 0701 03/22/18 0706 03/22/18 0701   97.5 °F (36.4 °C) 81 18 114/69 98 % WNL     Physical Exam   Constitutional: She is oriented to person, place, and time and well-developed,  well-nourished, and in no distress.   HENT:   Head: Normocephalic and atraumatic.   Mouth/Throat: Mucous membranes are normal.   Eyes: EOM are normal. No scleral icterus.   Neck: Normal range of motion. Neck supple.   No c-spine tenderness   Cardiovascular: Normal rate, regular rhythm and normal heart sounds.    Pulses:       Radial pulses are 2+ on the right side, and 2+ on the left side.        Dorsalis pedis pulses are 2+ on the right side, and 2+ on the left side.        Posterior tibial pulses are 2+ on the right side, and 2+ on the left side.   Pulmonary/Chest: Effort normal and breath sounds normal. No respiratory distress.   Abdominal: Soft. She exhibits no distension. There is no tenderness.   Musculoskeletal: Normal range of motion.   Left paraspinal lumbar tenderness, left hip tenderness, no tenderness to bilateral shoulders, no midline t-spine or l-spine tenderness, negative straight leg raises bilaterally, NV intact distally to all extremities    Neurological: She is alert and oriented to person, place, and time. She has normal motor skills and normal sensation.   Sensation and motor function intact to BLE   Skin: Skin is warm and dry.   Psychiatric: Mood and affect normal.   Nursing note and vitals reviewed.        RADIOLOGY     XR Spine Lumbar 4+ View (Final result) Result time: 03/22/18 09:51:24   Final result by Ez Gutiérrez MD (03/22/18 09:51:24)   Narrative:   LUMBAR SPINE SERIES AND X-RAYS OF THE LEFT HIP     CLINICAL HISTORY: Patient fell 1 week ago. Low back pain and left hip  pain radiates down the left leg.     Lumbar spine:     A total 5 views were obtained. There is minimal anterolisthesis of L4  with respect to L5 by approximately 4 mm. The alignment is otherwise  unremarkable. There is mild disc space narrowing at L3-L4 and L4-L5. The  remainder of the lumbar disc spaces are normal in height. All of the  vertebral bodies are normal in height. There is no evidence of recent or  old  fracture or subluxation.     Pelvis and left hip:     An AP view of the pelvis and AP and frog-leg lateral views of the left  hip demonstrate no bony or articular abnormalities. There is no  radiographic evidence of arthritis or recent or old fracture or  subluxation.     This report was finalized on 3/22/2018 9:51 AM by Dr. Ez Gutiérrez MD.          I ordered the above noted radiological studies and reviewed the images on the PACS system.         PROGRESS AND CONSULTS  0830- Ordered l-spine xray and left hip and pelvis xray for further evaluation. Ordered toradol for pain.   1008- Reviewed pt's history and workup with Dr. Aceves.  At bedside evaluation, they agree with the plan of care.  1036- Rechecked pt. She is resting comfortably and is in no acute distress. Reviewed implications of results (including left hip and pelvis xray findings (no fracture) and l-spine xray findings (no fracture)), diagnosis, meds, responsibility to follow up, warning signs and symptoms of possible worsening, potential complications and reasons to return to ER with patient.  Discussed all results and noted any abnormalities with patient.  Discussed absolute need to recheck abnormalities and condition with referred orthopedic surgeon and PMD. Informed pt of plan to prescribe short course of pain medication and muscle relaxer. Advised pt to apply heat or ice to painful areas with gentle stretching, to perform activity as tolerated, and to follow up as per Workman's compensation recommendation.   Discussed plan for discharge, as there is no emergent indication for admission.  Pt is agreeable and understands need for follow up and repeat testing.  Pt is aware that discharge does not mean that nothing is wrong but it indicates no emergency is present.  Pt is discharged with instructions to follow up with primary care doctor to have their blood pressure rechecked.       DIAGNOSIS  Final diagnoses:   Strain of lumbar region, initial  "encounter   Contusion of left hip, initial encounter       FOLLOW UP   Tariq Youngblood MD  [address] [facility] [id number] [phone]    Schedule an appointment as soon as possible for a visit   If symptoms worsen    Vita Bassett MD  [address]  [address]  [facility] [address]  [phone]    Schedule an appointment as soon as possible for a visit   As needed      RX     Medication List       cyclobenzaprine 10 MG tablet  Commonly known as:  FLEXERIL  Take 1 tablet by mouth 3 (Three) Times a Day As Needed for Muscle Spasms.     HYDROcodone-acetaminophen 5-325 MG per tablet  Commonly known as:  NORCO  Take 1 tablet by mouth Every 4 (Four) Hours As Needed for Moderate Pain .       [place] report [id number] reviewed.  Risks, benefits, alternatives discussed with patient.  Pt consents to treatment and agrees to follow up with PMD tomorrow for further care and any other prescriptions.         COURSE & MEDICAL DECISION MAKING  Pertinent Imaging studies that were ordered and reviewed are noted above.  Results were reviewed/discussed with the patient and they were also made aware of online assess.   Pt also made aware that some labs, such as cultures, will not be resulted during ER visit and follow up with PMD is necessary.     MEDICATIONS GIVEN IN ER  Medications   ketorolac (TORADOL) injection 60 mg (60 mg Intramuscular Given 3/22/18 0841)       /86 (Patient Position: Sitting)   Pulse 85   Temp 97.5 °F (36.4 °C)   Resp 18   Ht 167.6 cm (66\")   Wt 82.6 kg (182 lb)   SpO2 97%   BMI 29.38 kg/m²       I personally reviewed the past medical history, past surgical history, social history, family history, current medications and allergies as they appear in this chart.  The scribe's note accurately reflects the work and decisions made by me.     Documentation assistance provided by eugenia Mackenzie for CHELLY Gomez on 3/22/2018 at 10:37 AM. Information recorded by the " scribe was done at my direction and has been verified and validated by me.     Katharine Mackenzie  03/22/18 1052       Amanda Fuller, APRN  03/24/18 1035

## 2018-05-21 ENCOUNTER — DOCUMENTATION (OUTPATIENT)
Dept: PHYSICAL THERAPY | Facility: CLINIC | Age: 58
End: 2018-05-21

## 2018-05-21 NOTE — PROGRESS NOTES
Discharge Summary  Discharge Summary from Physical Therapy Report      Dates  PT visit: 1/16-3/21/18  Number of Visits: 21     Discharge Status of Patient: See progress Note dated 3/21/18    Goals: Partially Met    Discharge Plan: Continue with current home exercise program as instructed  Patient to return to referring/providing physician    Comments: pt did not return for follow up PT    Date of Discharge 5/21/18        Tereza Olmedo, PT  Physical Therapist